# Patient Record
Sex: MALE | Race: WHITE | NOT HISPANIC OR LATINO | Employment: OTHER | URBAN - METROPOLITAN AREA
[De-identification: names, ages, dates, MRNs, and addresses within clinical notes are randomized per-mention and may not be internally consistent; named-entity substitution may affect disease eponyms.]

---

## 2023-04-20 ENCOUNTER — OFFICE VISIT (OUTPATIENT)
Dept: PRIMARY CARE | Facility: CLINIC | Age: 53
End: 2023-04-20
Payer: COMMERCIAL

## 2023-04-20 VITALS
WEIGHT: 240 LBS | SYSTOLIC BLOOD PRESSURE: 116 MMHG | BODY MASS INDEX: 31.81 KG/M2 | DIASTOLIC BLOOD PRESSURE: 60 MMHG | HEIGHT: 73 IN

## 2023-04-20 DIAGNOSIS — F41.9 ANXIETY: ICD-10-CM

## 2023-04-20 DIAGNOSIS — S14.109S INJURY OF CERVICAL SPINAL CORD, SEQUELA (MULTI): ICD-10-CM

## 2023-04-20 DIAGNOSIS — N31.9 NEUROGENIC BLADDER: Primary | ICD-10-CM

## 2023-04-20 DIAGNOSIS — K21.9 GASTROESOPHAGEAL REFLUX DISEASE WITHOUT ESOPHAGITIS: ICD-10-CM

## 2023-04-20 DIAGNOSIS — L30.9 DERMATITIS: ICD-10-CM

## 2023-04-20 DIAGNOSIS — E78.2 MIXED HYPERLIPIDEMIA: ICD-10-CM

## 2023-04-20 DIAGNOSIS — E55.9 VITAMIN D DEFICIENCY: ICD-10-CM

## 2023-04-20 PROBLEM — I10 HYPERTENSION: Status: ACTIVE | Noted: 2023-04-20

## 2023-04-20 PROBLEM — E78.5 HYPERLIPIDEMIA: Status: ACTIVE | Noted: 2023-04-20

## 2023-04-20 PROBLEM — S14.109A CERVICAL SPINAL CORD INJURY (MULTI): Status: ACTIVE | Noted: 2023-04-20

## 2023-04-20 PROBLEM — N39.0 RECURRENT UTI: Status: ACTIVE | Noted: 2023-04-20

## 2023-04-20 PROBLEM — G82.20 PARAPLEGIA (MULTI): Status: ACTIVE | Noted: 2023-04-20

## 2023-04-20 PROBLEM — R23.8 SKIN IRRITATION: Status: ACTIVE | Noted: 2023-04-20

## 2023-04-20 PROBLEM — N39.0 URINARY TRACT INFECTION: Status: ACTIVE | Noted: 2023-04-20

## 2023-04-20 PROBLEM — J32.9 SINUSITIS: Status: ACTIVE | Noted: 2023-04-20

## 2023-04-20 PROBLEM — L21.9 SEBORRHEIC DERMATITIS: Status: ACTIVE | Noted: 2023-04-20

## 2023-04-20 PROCEDURE — 99213 OFFICE O/P EST LOW 20 MIN: CPT | Performed by: INTERNAL MEDICINE

## 2023-04-20 PROCEDURE — 3074F SYST BP LT 130 MM HG: CPT | Performed by: INTERNAL MEDICINE

## 2023-04-20 PROCEDURE — 3078F DIAST BP <80 MM HG: CPT | Performed by: INTERNAL MEDICINE

## 2023-04-20 RX ORDER — CITALOPRAM 20 MG/1
20 TABLET, FILM COATED ORAL DAILY
COMMUNITY
End: 2023-04-20 | Stop reason: SDUPTHER

## 2023-04-20 RX ORDER — TOLTERODINE TARTRATE 1 MG/1
1 TABLET, EXTENDED RELEASE ORAL DAILY
COMMUNITY
End: 2023-09-05

## 2023-04-20 RX ORDER — OMEPRAZOLE 20 MG/1
40 CAPSULE, DELAYED RELEASE ORAL EVERY 12 HOURS
Qty: 90 CAPSULE | Refills: 1 | Status: SHIPPED | OUTPATIENT
Start: 2023-04-20 | End: 2023-07-22

## 2023-04-20 RX ORDER — TRIAMCINOLONE ACETONIDE 5 MG/G
CREAM TOPICAL
COMMUNITY
End: 2023-04-20 | Stop reason: SDUPTHER

## 2023-04-20 RX ORDER — CITALOPRAM 20 MG/1
20 TABLET, FILM COATED ORAL DAILY
Qty: 90 TABLET | Refills: 1 | Status: SHIPPED | OUTPATIENT
Start: 2023-04-20

## 2023-04-20 RX ORDER — BACLOFEN 20 MG/1
20 TABLET ORAL 2 TIMES DAILY
Qty: 180 TABLET | Refills: 1 | Status: SHIPPED | OUTPATIENT
Start: 2023-04-20 | End: 2023-06-16 | Stop reason: SDUPTHER

## 2023-04-20 RX ORDER — OMEPRAZOLE 20 MG/1
TABLET, DELAYED RELEASE ORAL EVERY 12 HOURS
COMMUNITY
Start: 2022-08-03 | End: 2023-04-20 | Stop reason: ENTERED-IN-ERROR

## 2023-04-20 RX ORDER — TOLTERODINE 4 MG/1
4 CAPSULE, EXTENDED RELEASE ORAL DAILY
Qty: 90 CAPSULE | Refills: 1 | Status: SHIPPED | OUTPATIENT
Start: 2023-04-20 | End: 2023-10-10

## 2023-04-20 RX ORDER — TOLTERODINE TARTRATE 1 MG/1
1 TABLET, EXTENDED RELEASE ORAL DAILY
Qty: 90 TABLET | Refills: 1 | Status: CANCELLED | OUTPATIENT
Start: 2023-04-20

## 2023-04-20 RX ORDER — TRIAMCINOLONE ACETONIDE 5 MG/G
CREAM TOPICAL 3 TIMES DAILY
Qty: 454 G | Refills: 1 | Status: SHIPPED | OUTPATIENT
Start: 2023-04-20

## 2023-04-20 RX ORDER — OMEPRAZOLE 20 MG/1
40 CAPSULE, DELAYED RELEASE ORAL EVERY 12 HOURS
COMMUNITY
Start: 2023-01-17 | End: 2023-04-20 | Stop reason: SDUPTHER

## 2023-04-20 NOTE — PROGRESS NOTES
"Subjective   Patient ID: John Mora is a 52 y.o. male who presents for Follow-up.    HPI   Patient is here for follow-up  Needs refills on baclofen Detrol and omeprazole  He has not done blood work for a while  Overall doing well no new complaints  Chronic Kwan catheter for neurogenic bladder    Past recap  For hospital follow-up   patient is here with complaints of having a bump on the right upper eyelid for 1 year denies any pain  He was in the hospital admitted for a day because of hematuria from catheter getting pulled traumatically  No UTI   Wants refill on the inhaler for occasional cough use  Follow-up on high cholesterol      follow up after blood work   not able to exercise due to quadriplegia  does eat pasta and bread    Patient is here for his routine checkup  He has not had cholesterol checked for a while he has history of high cholesterol  His UTIs are under control since his doing 3 days of antibiotic when he changes the Kwan catheter  He has his regular follow-up with the hematologist and does yearly CBC  He needs his home care renewal  For his PMR doctor Dr. Seals has recommended him to do ultrasound of urinary bladder and kidneys    old note   patient is here for renewal of wheelchair   current one is about 8 years old   its irreparably damaged and unsafe to use   causing pressure sore on left foot , patient required minor surgery   his condition from cervical cord injury in 1993,april leaving him paraplegic has not changed   he remains completely paraplegic neck down and is dependent on family for care   unable to ambulate or use his upper limbs      Review of Systems    Objective   /60   Ht 1.854 m (6' 1\")   Wt 109 kg (240 lb)   BMI 31.66 kg/m²     Physical Exam  Vitals reviewed.   Constitutional:       Appearance: Normal appearance.   HENT:      Head: Normocephalic and atraumatic.      Right Ear: Tympanic membrane, ear canal and external ear normal.      Left Ear: Tympanic membrane, " ear canal and external ear normal.      Nose: Nose normal.      Mouth/Throat:      Pharynx: Oropharynx is clear.   Eyes:      Extraocular Movements: Extraocular movements intact.      Conjunctiva/sclera: Conjunctivae normal.      Pupils: Pupils are equal, round, and reactive to light.   Cardiovascular:      Rate and Rhythm: Normal rate and regular rhythm.      Pulses: Normal pulses.      Heart sounds: Normal heart sounds.   Pulmonary:      Effort: Pulmonary effort is normal.      Breath sounds: Normal breath sounds.   Abdominal:      General: Abdomen is flat. Bowel sounds are normal.      Palpations: Abdomen is soft.   Musculoskeletal:      Cervical back: Normal range of motion and neck supple.      Comments: Wheelchair-bound   Skin:     General: Skin is warm and dry.   Neurological:      Mental Status: He is alert and oriented to person, place, and time.      Sensory: Sensory deficit present.      Motor: Weakness present.      Coordination: Coordination abnormal.      Gait: Gait abnormal.      Deep Tendon Reflexes: Reflexes abnormal.      Comments: quadriplegia   Psychiatric:         Mood and Affect: Mood normal.         Assessment/Plan   Problem List Items Addressed This Visit          Nervous    Cervical spinal cord injury (CMS/HCC)       Digestive    GERD (gastroesophageal reflux disease)    Relevant Medications    omeprazole (PriLOSEC) 20 mg DR capsule    tolterodine LA (Detrol LA) 4 mg 24 hr capsule    Other Relevant Orders    CBC    Comprehensive Metabolic Panel    Lipid Panel    Thyroid Stimulating Hormone       Genitourinary    Neurogenic bladder - Primary       Endocrine/Metabolic    Vitamin D deficiency    Relevant Medications    tolterodine LA (Detrol LA) 4 mg 24 hr capsule    Other Relevant Orders    CBC    Comprehensive Metabolic Panel    Lipid Panel    Thyroid Stimulating Hormone       Infectious/Inflammatory    Dermatitis    Relevant Medications    triamcinolone (Kenalog) 0.5 % cream    baclofen  (Lioresal) 20 mg tablet    tolterodine LA (Detrol LA) 4 mg 24 hr capsule       Other    Anxiety    Relevant Medications    citalopram (CeleXA) 20 mg tablet    tolterodine LA (Detrol LA) 4 mg 24 hr capsule    Hyperlipidemia        past recap  Physical   Patient is at his baseline  Blood work ordered  Ultrasound bladder and kidneys ordered  Continue maintenance Cipro  Refills given    8/3  Blood work reviewed  Cholesterol high  Vitamin D low  Patient is really reluctant to take any medications  Discussed modification and lifestyle to help with vitamin D deficiency and to decrease cholesterol  Repeat blood work in 3 months    1/16/23  No further hematuria  Epidermal cyst advised to use warm compresses and Isauro baby shampoo to clean the eyelids  If not better see eye doctor to remove it  Patient still did not do blood work for cholesterol blood work ordered again  Liver enzyme was still elevated in the hospital  Cut down carbs  Follow-up after blood work  Nebulizer solution refilled     4/20.23  Blood work ordered again  Refills given on baclofen for muscle spasms and rigidity  Detrol LA for neurogenic bladder  Omeprazole for GERD  Blood work for high cholesterol

## 2023-05-10 ENCOUNTER — TELEMEDICINE (OUTPATIENT)
Dept: PRIMARY CARE | Facility: CLINIC | Age: 53
End: 2023-05-10
Payer: COMMERCIAL

## 2023-05-10 ENCOUNTER — LAB (OUTPATIENT)
Dept: LAB | Facility: LAB | Age: 53
End: 2023-05-10
Payer: COMMERCIAL

## 2023-05-10 VITALS — BODY MASS INDEX: 31.81 KG/M2 | WEIGHT: 240 LBS | HEIGHT: 73 IN

## 2023-05-10 DIAGNOSIS — R35.0 URINARY FREQUENCY: ICD-10-CM

## 2023-05-10 DIAGNOSIS — R50.9 FEVER AND CHILLS: Primary | ICD-10-CM

## 2023-05-10 DIAGNOSIS — N31.9 NEUROGENIC BLADDER: ICD-10-CM

## 2023-05-10 PROBLEM — K52.9 NONINFECTIOUS GASTROENTERITIS: Status: ACTIVE | Noted: 2020-04-21

## 2023-05-10 PROBLEM — B35.6 DERMATOPHYTOSIS OF PERIANAL AREA: Status: ACTIVE | Noted: 2020-04-21

## 2023-05-10 PROBLEM — L30.9 ECZEMA: Status: ACTIVE | Noted: 2020-04-21

## 2023-05-10 PROBLEM — R10.33 PERIUMBILICAL PAIN: Status: ACTIVE | Noted: 2020-04-21

## 2023-05-10 PROBLEM — K27.9 PEPTIC ULCER: Status: ACTIVE | Noted: 2020-04-21

## 2023-05-10 PROBLEM — E78.00 PURE HYPERCHOLESTEROLEMIA: Status: ACTIVE | Noted: 2020-04-21

## 2023-05-10 PROBLEM — D61.9 APLASTIC ANEMIA (MULTI): Status: ACTIVE | Noted: 2020-04-21

## 2023-05-10 PROBLEM — R79.89 ABNORMAL LIVER FUNCTION TESTS: Status: ACTIVE | Noted: 2020-04-21

## 2023-05-10 PROBLEM — N39.0 URINARY TRACT INFECTIOUS DISEASE: Status: ACTIVE | Noted: 2020-04-21

## 2023-05-10 LAB
APPEARANCE, URINE: ABNORMAL
BILIRUBIN, URINE: NEGATIVE
BLOOD, URINE: ABNORMAL
COLOR, URINE: ABNORMAL
GLUCOSE, URINE: NEGATIVE MG/DL
KETONES, URINE: NEGATIVE MG/DL
LEUKOCYTE ESTERASE, URINE: ABNORMAL
MUCUS, URINE: ABNORMAL /LPF
NITRITE, URINE: POSITIVE
PH, URINE: 5 (ref 5–8)
PROTEIN, URINE: ABNORMAL MG/DL
RBC, URINE: 36 /HPF (ref 0–5)
SPECIFIC GRAVITY, URINE: 1.02 (ref 1–1.03)
SQUAMOUS EPITHELIAL CELLS, URINE: 1 /HPF
UROBILINOGEN, URINE: <2 MG/DL (ref 0–1.9)
WBC CLUMPS, URINE: ABNORMAL /HPF
WBC, URINE: >182 /HPF (ref 0–5)

## 2023-05-10 PROCEDURE — 99213 OFFICE O/P EST LOW 20 MIN: CPT | Performed by: INTERNAL MEDICINE

## 2023-05-10 PROCEDURE — 87077 CULTURE AEROBIC IDENTIFY: CPT

## 2023-05-10 PROCEDURE — 87086 URINE CULTURE/COLONY COUNT: CPT

## 2023-05-10 PROCEDURE — 87186 SC STD MICRODIL/AGAR DIL: CPT

## 2023-05-10 PROCEDURE — 81001 URINALYSIS AUTO W/SCOPE: CPT

## 2023-05-10 RX ORDER — ASPIRIN 81 MG/1
81 TABLET ORAL
COMMUNITY

## 2023-05-10 RX ORDER — BISACODYL 10 MG/1
10 SUPPOSITORY RECTAL
COMMUNITY
Start: 2020-04-21

## 2023-05-10 NOTE — PROGRESS NOTES
"Subjective   Patient ID: John Mora is a 52 y.o. male who presents for Difficulty Urinating.    HPI   Patient here complaining of not feeling good since yesterday having chills headache mucus around the catheter   He had his catheter replaced last week and takes antibiotics and has not had a problem for many years but now he is concerned that he could be having UTI     Past recap  patient is here for follow-up  Needs refills on baclofen Detrol and omeprazole  He has not done blood work for a while  Overall doing well no new complaints  Chronic Kwan catheter for neurogenic bladder     Past recap  For hospital follow-up   patient is here with complaints of having a bump on the right upper eyelid for 1 year denies any pain  He was in the hospital admitted for a day because of hematuria from catheter getting pulled traumatically  No UTI   Wants refill on the inhaler for occasional cough use  Follow-up on high cholesterol      follow up after blood work   not able to exercise due to quadriplegia  does eat pasta and bread     Patient is here for his routine checkup  He has not had cholesterol checked for a while he has history of high cholesterol  His UTIs are under control since his doing 3 days of antibiotic when he changes the Kwan catheter  He has his regular follow-up with the hematologist and does yearly CBC  He needs his home care renewal  For his PMR doctor Dr. Seals has recommended him to do ultrasound of urinary bladder and kidneys     old note   patient is here for renewal of wheelchair   current one is about 8 years old   its irreparably damaged and unsafe to use   causing pressure sore on left foot , patient required minor surgery   his condition from cervical cord injury in 1993,april leaving him paraplegic has not changed   he remains completely paraplegic neck down and is dependent on family for care   unable to ambulate or use his upper limbs      Review of Systems    Objective   Ht 1.854 m (6' 1\")   " Wt 109 kg (240 lb)   BMI 31.66 kg/m²     Physical Exam    Assessment/Plan   Problem List Items Addressed This Visit    None  Visit Diagnoses       Urinary frequency        Relevant Orders    Urinalysis with Reflex Microscopic    Urine Culture              past recap  Physical   Patient is at his baseline  Blood work ordered  Ultrasound bladder and kidneys ordered  Continue maintenance Cipro  Refills given     8/3  Blood work reviewed  Cholesterol high  Vitamin D low  Patient is really reluctant to take any medications  Discussed modification and lifestyle to help with vitamin D deficiency and to decrease cholesterol  Repeat blood work in 3 months     1/16/23  No further hematuria  Epidermal cyst advised to use warm compresses and Isauro baby shampoo to clean the eyelids  If not better see eye doctor to remove it  Patient still did not do blood work for cholesterol blood work ordered again  Liver enzyme was still elevated in the hospital  Cut down carbs  Follow-up after blood work  Nebulizer solution refilled      4/20.23  Blood work ordered again  Refills given on baclofen for muscle spasms and rigidity  Detrol LA for neurogenic bladder  Omeprazole for GERD  Blood work for high cholesterol    510 1/20/2023  We will send [facility] CNS  Patient is prone for UTI because of neurogenic bladder and chronic Kwan catheter  Advised to start the maintenance antibiotic again while waiting for the culture results and will call him with the results and adjust antibiotics

## 2023-05-12 LAB — URINE CULTURE: ABNORMAL

## 2023-05-15 DIAGNOSIS — N39.0 URINARY TRACT INFECTION WITHOUT HEMATURIA, SITE UNSPECIFIED: ICD-10-CM

## 2023-05-16 ENCOUNTER — LAB (OUTPATIENT)
Dept: LAB | Facility: LAB | Age: 53
End: 2023-05-16
Payer: COMMERCIAL

## 2023-05-16 DIAGNOSIS — N39.0 URINARY TRACT INFECTION WITHOUT HEMATURIA, SITE UNSPECIFIED: ICD-10-CM

## 2023-05-16 LAB
APPEARANCE, URINE: ABNORMAL
BILIRUBIN, URINE: NEGATIVE
BLOOD, URINE: NEGATIVE
COLOR, URINE: YELLOW
GLUCOSE, URINE: NEGATIVE MG/DL
KETONES, URINE: NEGATIVE MG/DL
LEUKOCYTE ESTERASE, URINE: ABNORMAL
NITRITE, URINE: NEGATIVE
PH, URINE: 7 (ref 5–8)
PROTEIN, URINE: NEGATIVE MG/DL
RBC, URINE: NORMAL /HPF (ref 0–5)
SPECIFIC GRAVITY, URINE: 1.01 (ref 1–1.03)
SQUAMOUS EPITHELIAL CELLS, URINE: <1 /HPF
UROBILINOGEN, URINE: <2 MG/DL (ref 0–1.9)
WBC, URINE: NORMAL /HPF (ref 0–5)

## 2023-05-16 PROCEDURE — 81001 URINALYSIS AUTO W/SCOPE: CPT

## 2023-05-16 PROCEDURE — 87186 SC STD MICRODIL/AGAR DIL: CPT

## 2023-05-16 PROCEDURE — 87077 CULTURE AEROBIC IDENTIFY: CPT

## 2023-05-16 PROCEDURE — 87086 URINE CULTURE/COLONY COUNT: CPT

## 2023-05-18 ENCOUNTER — TELEPHONE (OUTPATIENT)
Dept: PRIMARY CARE | Facility: CLINIC | Age: 53
End: 2023-05-18
Payer: COMMERCIAL

## 2023-05-18 LAB — URINE CULTURE: ABNORMAL

## 2023-06-16 DIAGNOSIS — L30.9 DERMATITIS: ICD-10-CM

## 2023-06-16 RX ORDER — BACLOFEN 20 MG/1
20 TABLET ORAL 3 TIMES DAILY
Qty: 180 TABLET | Refills: 1 | Status: SHIPPED | OUTPATIENT
Start: 2023-06-16 | End: 2023-10-10

## 2023-07-22 DIAGNOSIS — K21.9 GASTROESOPHAGEAL REFLUX DISEASE WITHOUT ESOPHAGITIS: ICD-10-CM

## 2023-07-22 RX ORDER — OMEPRAZOLE 20 MG/1
40 CAPSULE, DELAYED RELEASE ORAL EVERY 12 HOURS
Qty: 90 CAPSULE | Refills: 0 | Status: SHIPPED | OUTPATIENT
Start: 2023-07-22 | End: 2023-07-23

## 2023-07-23 DIAGNOSIS — K21.9 GASTROESOPHAGEAL REFLUX DISEASE WITHOUT ESOPHAGITIS: ICD-10-CM

## 2023-07-23 RX ORDER — OMEPRAZOLE 40 MG/1
40 CAPSULE, DELAYED RELEASE ORAL 2 TIMES DAILY
Qty: 180 CAPSULE | Refills: 0 | Status: SHIPPED | OUTPATIENT
Start: 2023-07-23 | End: 2023-07-24 | Stop reason: ENTERED-IN-ERROR

## 2023-07-24 DIAGNOSIS — K21.9 GASTROESOPHAGEAL REFLUX DISEASE WITHOUT ESOPHAGITIS: ICD-10-CM

## 2023-07-24 RX ORDER — OMEPRAZOLE 20 MG/1
20 TABLET, DELAYED RELEASE ORAL
Qty: 180 TABLET | Refills: 1 | COMMUNITY
Start: 2023-07-24 | End: 2024-01-20

## 2023-07-25 NOTE — PROGRESS NOTES
[facility] for omeprazole 20mg 1 tab bid qty 180 1 rf. [facility] will notify pt when ready to be picked up

## 2023-09-05 ENCOUNTER — TELEPHONE (OUTPATIENT)
Dept: PRIMARY CARE | Facility: CLINIC | Age: 53
End: 2023-09-05
Payer: COMMERCIAL

## 2023-09-05 DIAGNOSIS — K21.9 GASTRO-ESOPHAGEAL REFLUX DISEASE WITHOUT ESOPHAGITIS: ICD-10-CM

## 2023-09-05 RX ORDER — TOLTERODINE TARTRATE 1 MG/1
1 TABLET, EXTENDED RELEASE ORAL DAILY
Qty: 30 TABLET | Refills: 2 | Status: SHIPPED | OUTPATIENT
Start: 2023-09-05

## 2023-09-14 DIAGNOSIS — G82.20 PARAPLEGIA (MULTI): ICD-10-CM

## 2023-10-09 DIAGNOSIS — E55.9 VITAMIN D DEFICIENCY: ICD-10-CM

## 2023-10-09 DIAGNOSIS — F41.9 ANXIETY: ICD-10-CM

## 2023-10-09 DIAGNOSIS — K21.9 GASTROESOPHAGEAL REFLUX DISEASE WITHOUT ESOPHAGITIS: ICD-10-CM

## 2023-10-09 DIAGNOSIS — L30.9 DERMATITIS: ICD-10-CM

## 2023-10-10 RX ORDER — BACLOFEN 20 MG/1
20 TABLET ORAL 3 TIMES DAILY
Qty: 180 TABLET | Refills: 1 | Status: SHIPPED | OUTPATIENT
Start: 2023-10-10

## 2023-10-10 RX ORDER — TOLTERODINE 4 MG/1
4 CAPSULE, EXTENDED RELEASE ORAL DAILY
Qty: 90 CAPSULE | Refills: 1 | Status: SHIPPED | OUTPATIENT
Start: 2023-10-10 | End: 2024-04-07

## 2024-01-29 ENCOUNTER — OFFICE VISIT (OUTPATIENT)
Dept: PRIMARY CARE | Facility: CLINIC | Age: 54
End: 2024-01-29
Payer: COMMERCIAL

## 2024-01-29 VITALS — SYSTOLIC BLOOD PRESSURE: 120 MMHG | DIASTOLIC BLOOD PRESSURE: 62 MMHG

## 2024-01-29 DIAGNOSIS — L21.9 SEBORRHEIC DERMATITIS: ICD-10-CM

## 2024-01-29 PROCEDURE — 3074F SYST BP LT 130 MM HG: CPT | Performed by: INTERNAL MEDICINE

## 2024-01-29 PROCEDURE — 99213 OFFICE O/P EST LOW 20 MIN: CPT | Performed by: INTERNAL MEDICINE

## 2024-01-29 PROCEDURE — 3078F DIAST BP <80 MM HG: CPT | Performed by: INTERNAL MEDICINE

## 2024-01-29 RX ORDER — KETOCONAZOLE 20 MG/ML
SHAMPOO, SUSPENSION TOPICAL 2 TIMES WEEKLY
Qty: 3600 ML | Refills: 0 | Status: SHIPPED | OUTPATIENT
Start: 2024-01-29

## 2024-01-29 RX ORDER — TRIAMCINOLONE ACETONIDE 5 MG/G
CREAM TOPICAL 3 TIMES DAILY
Qty: 454 G | Refills: 1 | Status: CANCELLED | OUTPATIENT
Start: 2024-01-29

## 2024-01-29 RX ORDER — CLOBETASOL PROPIONATE 0.5 MG/G
AEROSOL, FOAM TOPICAL 2 TIMES DAILY
Qty: 100 G | Refills: 1 | Status: SHIPPED | OUTPATIENT
Start: 2024-01-29

## 2024-01-30 NOTE — PROGRESS NOTES
Subjective   Patient ID: John Mora is a 53 y.o. male who presents for Rash.    Rash       Patient is here with complaints of having rash on the face.  It is mainly on the right side of the face and the right forehead.  He does have a tendency to sweat on the right face from his neurogenic condition     Past recap   patient is here for follow-up  Needs refills on baclofen Detrol and omeprazole  He has not done blood work for a while  Overall doing well no new complaints  Chronic Kwan catheter for neurogenic bladder    Past recap  For hospital follow-up   patient is here with complaints of having a bump on the right upper eyelid for 1 year denies any pain  He was in the hospital admitted for a day because of hematuria from catheter getting pulled traumatically  No UTI   Wants refill on the inhaler for occasional cough use  Follow-up on high cholesterol      follow up after blood work   not able to exercise due to quadriplegia  does eat pasta and bread    Patient is here for his routine checkup  He has not had cholesterol checked for a while he has history of high cholesterol  His UTIs are under control since his doing 3 days of antibiotic when he changes the Kwan catheter  He has his regular follow-up with the hematologist and does yearly CBC  He needs his home care renewal  For his PMR doctor Dr. Seals has recommended him to do ultrasound of urinary bladder and kidneys    old note   patient is here for renewal of wheelchair   current one is about 8 years old   its irreparably damaged and unsafe to use   causing pressure sore on left foot , patient required minor surgery   his condition from cervical cord injury in 1993,april leaving him paraplegic has not changed   he remains completely paraplegic neck down and is dependent on family for care   unable to ambulate or use his upper limbs      Review of Systems   Skin:  Positive for rash.       Objective   /62     Physical Exam  Vitals reviewed.    Constitutional:       Appearance: Normal appearance.   HENT:      Head: Normocephalic and atraumatic.      Right Ear: Tympanic membrane, ear canal and external ear normal.      Left Ear: Tympanic membrane, ear canal and external ear normal.      Nose: Nose normal.      Mouth/Throat:      Pharynx: Oropharynx is clear.   Eyes:      Extraocular Movements: Extraocular movements intact.      Conjunctiva/sclera: Conjunctivae normal.      Pupils: Pupils are equal, round, and reactive to light.   Cardiovascular:      Rate and Rhythm: Normal rate and regular rhythm.      Pulses: Normal pulses.      Heart sounds: Normal heart sounds.   Pulmonary:      Effort: Pulmonary effort is normal.      Breath sounds: Normal breath sounds.   Abdominal:      General: Abdomen is flat. Bowel sounds are normal.      Palpations: Abdomen is soft.   Musculoskeletal:      Cervical back: Normal range of motion and neck supple.      Comments: Wheelchair-bound   Skin:     General: Skin is warm and dry.      Findings: Rash present.   Neurological:      Mental Status: He is alert and oriented to person, place, and time.      Sensory: Sensory deficit present.      Motor: Weakness present.      Coordination: Coordination abnormal.      Gait: Gait abnormal.      Deep Tendon Reflexes: Reflexes abnormal.      Comments: quadriplegia   Psychiatric:         Mood and Affect: Mood normal.         Assessment/Plan   Problem List Items Addressed This Visit          Skin    Dermatitis    Relevant Medications    ketoconazole (NIZOral) 2 % shampoo    clobetasol (Olux) 0.05 % topical foam        past recap  Physical   Patient is at his baseline  Blood work ordered  Ultrasound bladder and kidneys ordered  Continue maintenance Cipro  Refills given    8/3  Blood work reviewed  Cholesterol high  Vitamin D low  Patient is really reluctant to take any medications  Discussed modification and lifestyle to help with vitamin D deficiency and to decrease cholesterol  Repeat  blood work in 3 months    1/16/23  No further hematuria  Epidermal cyst advised to use warm compresses and Isauro baby shampoo to clean the eyelids  If not better see eye doctor to remove it  Patient still did not do blood work for cholesterol blood work ordered again  Liver enzyme was still elevated in the hospital  Cut down carbs  Follow-up after blood work  Nebulizer solution refilled     4/20.23  Blood work ordered again  Refills given on baclofen for muscle spasms and rigidity  Detrol LA for neurogenic bladder  Omeprazole for GERD  Blood work for high cholesterol    1/29/2024  Patient has seborrheic dermatitis  Will treat with ketoconazole shampoo and Olux foam  Discussed pathophysiology  Follow-up for recheck in few weeks

## 2024-02-01 ENCOUNTER — TELEPHONE (OUTPATIENT)
Dept: PRIMARY CARE | Facility: CLINIC | Age: 54
End: 2024-02-01
Payer: COMMERCIAL

## 2024-09-27 ENCOUNTER — TELEMEDICINE (OUTPATIENT)
Dept: PRIMARY CARE | Facility: CLINIC | Age: 54
End: 2024-09-27
Payer: COMMERCIAL

## 2024-09-27 DIAGNOSIS — L30.9 DERMATITIS: ICD-10-CM

## 2024-09-27 DIAGNOSIS — L21.9 SEBORRHEIC DERMATITIS: Primary | ICD-10-CM

## 2024-09-27 PROCEDURE — 99213 OFFICE O/P EST LOW 20 MIN: CPT | Performed by: INTERNAL MEDICINE

## 2024-09-27 RX ORDER — TRIAMCINOLONE ACETONIDE 5 MG/G
CREAM TOPICAL 3 TIMES DAILY
Qty: 454 G | Refills: 1 | Status: SHIPPED | OUTPATIENT
Start: 2024-09-27

## 2024-09-30 NOTE — PROGRESS NOTES
Subjective   Patient ID: John Mora is a 54 y.o. male who presents for Virtual Visit and Med Refill.    Rash       Patient is having flareup of the dermatitis he had before having rash on the chin and the nose  Patient could not come in person because his transport his dad is in the hospital undergoing bladder cancer treatment    Past recap  Patient is here with complaints of having rash on the face.  It is mainly on the right side of the face and the right forehead.  He does have a tendency to sweat on the right face from his neurogenic condition     patient is here for follow-up  Needs refills on baclofen Detrol and omeprazole  He has not done blood work for a while  Overall doing well no new complaints  Chronic Kwan catheter for neurogenic bladder    For hospital follow-up   patient is here with complaints of having a bump on the right upper eyelid for 1 year denies any pain  He was in the hospital admitted for a day because of hematuria from catheter getting pulled traumatically  No UTI   Wants refill on the inhaler for occasional cough use  Follow-up on high cholesterol      follow up after blood work   not able to exercise due to quadriplegia  does eat pasta and bread    Patient is here for his routine checkup  He has not had cholesterol checked for a while he has history of high cholesterol  His UTIs are under control since his doing 3 days of antibiotic when he changes the Kwan catheter  He has his regular follow-up with the hematologist and does yearly CBC  He needs his home care renewal  For his PMR doctor Dr. Seals has recommended him to do ultrasound of urinary bladder and kidneys    old note   patient is here for renewal of wheelchair   current one is about 8 years old   its irreparably damaged and unsafe to use   causing pressure sore on left foot , patient required minor surgery   his condition from cervical cord injury in 1993,april leaving him paraplegic has not changed   he remains completely  paraplegic neck down and is dependent on family for care   unable to ambulate or use his upper limbs      Review of Systems   Skin:  Positive for rash.       Objective   There were no vitals taken for this visit.        Assessment/Plan   Problem List Items Addressed This Visit          Skin    Dermatitis    Relevant Medications    triamcinolone (Kenalog) 0.5 % cream        past recap  Physical   Patient is at his baseline  Blood work ordered  Ultrasound bladder and kidneys ordered  Continue maintenance Cipro  Refills given    8/3  Blood work reviewed  Cholesterol high  Vitamin D low  Patient is really reluctant to take any medications  Discussed modification and lifestyle to help with vitamin D deficiency and to decrease cholesterol  Repeat blood work in 3 months    1/16/23  No further hematuria  Epidermal cyst advised to use warm compresses and Isauro baby shampoo to clean the eyelids  If not better see eye doctor to remove it  Patient still did not do blood work for cholesterol blood work ordered again  Liver enzyme was still elevated in the hospital  Cut down carbs  Follow-up after blood work  Nebulizer solution refilled     4/20.23  Blood work ordered again  Refills given on baclofen for muscle spasms and rigidity  Detrol LA for neurogenic bladder  Omeprazole for GERD  Blood work for high cholesterol    1/29/2024  Patient has seborrheic dermatitis  Will treat with ketoconazole shampoo and Olux foam  Discussed pathophysiology  Follow-up for recheck in few weeks    9/27/2024  Triamcinolone cream for the dermatitis refilled  Continue ketoconazole shampoo as maintenance

## 2024-10-29 ENCOUNTER — APPOINTMENT (OUTPATIENT)
Dept: PRIMARY CARE | Facility: CLINIC | Age: 54
End: 2024-10-29
Payer: COMMERCIAL

## 2024-10-31 ENCOUNTER — APPOINTMENT (OUTPATIENT)
Dept: PRIMARY CARE | Facility: CLINIC | Age: 54
End: 2024-10-31
Payer: COMMERCIAL

## 2024-10-31 VITALS — DIASTOLIC BLOOD PRESSURE: 72 MMHG | SYSTOLIC BLOOD PRESSURE: 122 MMHG

## 2024-10-31 DIAGNOSIS — H61.23 BILATERAL IMPACTED CERUMEN: ICD-10-CM

## 2024-10-31 PROCEDURE — 3078F DIAST BP <80 MM HG: CPT | Performed by: INTERNAL MEDICINE

## 2024-10-31 PROCEDURE — 3074F SYST BP LT 130 MM HG: CPT | Performed by: INTERNAL MEDICINE

## 2024-10-31 PROCEDURE — 99213 OFFICE O/P EST LOW 20 MIN: CPT | Performed by: INTERNAL MEDICINE

## 2024-11-02 PROCEDURE — 69209 REMOVE IMPACTED EAR WAX UNI: CPT | Performed by: INTERNAL MEDICINE

## 2025-03-03 ENCOUNTER — OFFICE VISIT (OUTPATIENT)
Dept: PRIMARY CARE | Facility: CLINIC | Age: 55
End: 2025-03-03
Payer: COMMERCIAL

## 2025-03-03 DIAGNOSIS — L08.9 TOE INFECTION: ICD-10-CM

## 2025-03-03 DIAGNOSIS — L03.116 CELLULITIS OF LEFT LOWER EXTREMITY: Primary | ICD-10-CM

## 2025-03-03 PROCEDURE — 99214 OFFICE O/P EST MOD 30 MIN: CPT | Performed by: INTERNAL MEDICINE

## 2025-03-03 RX ORDER — DOXYCYCLINE 100 MG/1
100 CAPSULE ORAL 2 TIMES DAILY
Qty: 20 CAPSULE | Refills: 0 | Status: SHIPPED | OUTPATIENT
Start: 2025-03-03

## 2025-03-03 RX ORDER — AMOXICILLIN AND CLAVULANATE POTASSIUM 875; 125 MG/1; MG/1
875 TABLET, FILM COATED ORAL 2 TIMES DAILY
Qty: 20 TABLET | Refills: 0 | Status: SHIPPED | OUTPATIENT
Start: 2025-03-03 | End: 2025-03-13

## 2025-03-06 NOTE — PROGRESS NOTES
Subjective   Patient ID: John Mora is a 54 y.o. male who presents for Foot Wound Check (Left foot swollen, left great toe wound, left inner knee redness).    HPI   Patient presents with complaints of left big toe wound for few weeks.  Now he is having redness of the foot on the leg and feeling into the thigh  Review of Systems    Objective   There were no vitals taken for this visit.    Physical Exam  Vitals reviewed.   Constitutional:       Appearance: Normal appearance.   HENT:      Head: Normocephalic and atraumatic.      Right Ear: Tympanic membrane, ear canal and external ear normal.      Left Ear: Tympanic membrane, ear canal and external ear normal.      Nose: Nose normal.      Mouth/Throat:      Pharynx: Oropharynx is clear.   Eyes:      Extraocular Movements: Extraocular movements intact.      Conjunctiva/sclera: Conjunctivae normal.      Pupils: Pupils are equal, round, and reactive to light.   Cardiovascular:      Rate and Rhythm: Normal rate and regular rhythm.      Pulses: Normal pulses.      Heart sounds: Normal heart sounds.   Pulmonary:      Effort: Pulmonary effort is normal.      Breath sounds: Normal breath sounds.   Abdominal:      General: Abdomen is flat. Bowel sounds are normal.      Palpations: Abdomen is soft.   Musculoskeletal:      Cervical back: Normal range of motion and neck supple.      Comments: Paraplegic   Skin:     General: Skin is warm and dry.      Findings: Lesion and rash present.      Comments: Left great toe ulceration on the tip with swelling of the great  Erythema extending on the foot going up the leg and a streak going up the left thigh   Neurological:      General: No focal deficit present.      Mental Status: He is alert and oriented to person, place, and time.   Psychiatric:         Mood and Affect: Mood normal.         Assessment/Plan   Problem List Items Addressed This Visit    None  Visit Diagnoses         Codes    Cellulitis of left lower extremity    -   Primary L03.116    Toe infection     L08.9    Relevant Medications    amoxicillin-pot clavulanate (Augmentin) 875-125 mg tablet    doxycycline (Vibramycin) 100 mg capsule        Advised patient to be hospitalized for IV antibiotic as cellulitis is extensive  Father and son does not want to be in the hospital at this point they want to try oral antibiotic first  Prescription for Augmentin and doxycycline.  Follow-up in a week  Advised to go to the hospital if symptoms gets worse

## 2025-03-13 ENCOUNTER — CLINICAL SUPPORT (OUTPATIENT)
Dept: OCCUPATIONAL THERAPY | Facility: CLINIC | Age: 55
End: 2025-03-13
Payer: COMMERCIAL

## 2025-03-13 DIAGNOSIS — M62.838 MUSCLE SPASTICITY: ICD-10-CM

## 2025-03-13 DIAGNOSIS — G82.51 QUADRIPLEGIA, C1-C4 COMPLETE (MULTI): Primary | ICD-10-CM

## 2025-03-13 DIAGNOSIS — M62.838 OTHER MUSCLE SPASM: ICD-10-CM

## 2025-03-13 PROCEDURE — 97167 OT EVAL HIGH COMPLEX 60 MIN: CPT | Mod: GO

## 2025-03-13 ASSESSMENT — PAIN - FUNCTIONAL ASSESSMENT: PAIN_FUNCTIONAL_ASSESSMENT: 0-10

## 2025-03-13 ASSESSMENT — PAIN SCALES - GENERAL: PAINLEVEL_OUTOF10: 0 - NO PAIN

## 2025-03-13 NOTE — PROGRESS NOTES
Occupational Therapy    Occupational Therapy Evaluation    Name: John Mora  MRN: [id number]  : 1970  Date: 25      Time Calculation  Start Time: 0100  Stop Time: 0200  Time Calculation (min): 60 min  OT Evaluation Time Entry  OT Evaluation (Complex) Time Entry: 60                    Visit: 1  [facility]  Problem List Items Addressed This Visit             ICD-10-CM    Muscle spasticity M62.838     Other Visit Diagnoses         Codes    Quadriplegia, C1-C4 complete (Multi)    -  Primary G82.51    Other muscle spasm     M62.838            Assessment:   Patient is a 54 year old male, who is a C1-C4 quadriplegia, resulting in patient requiring total A for all ADLs and transfers. Due to patient's diagnosis, patient is non ambulatory and cannot use a cane, walker, or crutches. Due to quadriplegia and poor sitting balance, a regular standard wheelchair cannot give patient the postural support patient requires for sitting balance and sitting tolerance for ADLs. Patient requires a manual tilt in space with a roho cushion to relieve pressure to reduce risk of further pressure sores.     Plan:   No further visits planned.    Subjective  Patient agreeable to wheelchair evaluation. Accompanied by father.  Patient has been a Quadriplegic since  due to a sports accident.   Patient lives with parents in a [place] house. Has ramp into the home. Has first floor set up. Has assist with all ADLs and transfers. Gets transferred with a gregorio lift. Has a hospital bed. Patient also has a home alex aide.  Patient reports currently has a pressure wound on L ischium,  and getting wound care currently. Patient also has a small wound on L big toe.   Reports is in his chair about 9-10 hours a day.   Patient's current manual tilt in space is over 5 years old that patient and family purchased themselves.    6'1''  233#    Current Problem:  1. Quadriplegia, C1-C4 complete (Multi)  Referral to Occupational Therapy      2. Other  "muscle spasm  Referral to Occupational Therapy      3. Muscle spasticity          General:      General  Reason for Referral: Wheelchair evaluation  Referred By:  (Yehuda Seals, )  Preferred Learning Style: verbal, visual, written  Precautions:   HIGH FALL RISK;   Vital Signs:   Not taken   Pain Assessment:  Pain Assessment  Pain Assessment: 0-10  0-10 (Numeric) Pain Score: 0 - No pain  Work History:  Not working   Prior Level of Function:  Requires dependent care for all ADLs and transfers  Roles/Routines:  Likes to go on the computer   Patient Goal:  \"Get a chair that I fit in better\"  Personal Factors that my impact Care:   N/a  Objective   Originally R handed  Observation:  Came in manual tilt w/c with chest belt  LLE slightly more abducted in sitting  Able to control head and neck    ROM  RUE:   PROM only   Shoulder flexion to 90 degrees  LUE:   PROM only     BLE:  PROM only  Hip flexion to 90 degrees     Strength:  RUE:   Shoulder elevation: 2-/5  Shoulder flexion: 1/5  shoulder abduction: 0/5   External rotation:  0/5  Internal rotation:   0/5  Elbow flexion:   0/5  Wrist extension:   0/5  Hand edema   Mild digit contractures  LUE:   Shoulder elevation: 2-/5  Shoulder flexion:  shoulder abduction: 0/5   External rotation: 0 /5  Internal rotation:   0/5  Elbow flexion:   0 /5  Wrist extension:   0/5  Hand edema  Mild digit contractures    RLE: hip flexion: 0 /5    hip extension:  0/5    hip abduction:  0/5     hip adduction:  0/5     knee flexion:   0/5,    knee extension:   0 /5      dorsiflexion:  0/5    plantar flexion:   0/5  Mild edema in ankle/foot    LLE: hip flexion:  0/5    hip extension:  0/5    hip abduction: 0/5      hip adduction: 0 /5       knee flexion:  0/5,    knee extension:    0/5      dorsiflexion:0  /5      plantar flexion:   0/5   Mild edema in ankle/foot  Coordination:  Impaired  Sensation:  Impaired  Tone:  Mild to moderate tone throughout BUE's and BLE's  Outcome Measures:  Total A " for transfers  Non ambulatory  Poor sitting balance    OP EDUCATION:   Educated on wheelchair process  I also educated patient on tilting back more throughout the day, as well as the importance of good supports on wheelchair to reduce pressure sores at other points of contact ex toes, knees, hips.

## 2025-03-27 ENCOUNTER — TELEPHONE (OUTPATIENT)
Dept: PRIMARY CARE | Facility: CLINIC | Age: 55
End: 2025-03-27
Payer: COMMERCIAL

## 2025-03-28 ENCOUNTER — OFFICE VISIT (OUTPATIENT)
Dept: PRIMARY CARE | Facility: CLINIC | Age: 55
End: 2025-03-28
Payer: COMMERCIAL

## 2025-03-28 VITALS — SYSTOLIC BLOOD PRESSURE: 130 MMHG | HEIGHT: 73 IN | BODY MASS INDEX: 31.66 KG/M2 | DIASTOLIC BLOOD PRESSURE: 72 MMHG

## 2025-03-28 DIAGNOSIS — E43 UNSPECIFIED SEVERE PROTEIN-CALORIE MALNUTRITION (MULTI): ICD-10-CM

## 2025-03-28 DIAGNOSIS — G82.20 PARAPLEGIA, UNSPECIFIED: ICD-10-CM

## 2025-03-28 DIAGNOSIS — Z00.00 ANNUAL PHYSICAL EXAM: Primary | ICD-10-CM

## 2025-03-28 DIAGNOSIS — L08.9 TOE INFECTION: ICD-10-CM

## 2025-03-28 PROCEDURE — 99396 PREV VISIT EST AGE 40-64: CPT | Performed by: INTERNAL MEDICINE

## 2025-03-28 PROCEDURE — 3075F SYST BP GE 130 - 139MM HG: CPT | Performed by: INTERNAL MEDICINE

## 2025-03-28 PROCEDURE — 3078F DIAST BP <80 MM HG: CPT | Performed by: INTERNAL MEDICINE

## 2025-03-28 RX ORDER — DOXYCYCLINE 100 MG/1
100 CAPSULE ORAL 2 TIMES DAILY
Qty: 20 CAPSULE | Refills: 0 | Status: SHIPPED | OUTPATIENT
Start: 2025-03-28

## 2025-03-29 NOTE — PROGRESS NOTES
"Subjective   Patient ID: John Mora is a 54 y.o. male who presents for Follow-up.    HPI   Patient is here for follow-up on the left big toe infection.  Dad is taking care of the wound.  It is healing beautifully.  Patient is also due for his routine blood work has not done a physical for a long time     Patient presents with complaints of left big toe wound for few weeks.  Now he is having redness of the foot on the leg and feeling into the thigh    Social history: Non-smoker social drinker  Past medical history cervical cord injury in 1993 April leaving him paraplegic, parents provide care history of high cholesterol, chronic Kwan catheter for neurogenic bladder  Review of Systems    Objective   /72   Ht 1.854 m (6' 1\")   BMI 31.66 kg/m²     Physical Exam  Vitals reviewed.   Constitutional:       Appearance: Normal appearance.   HENT:      Head: Normocephalic and atraumatic.      Right Ear: Tympanic membrane, ear canal and external ear normal.      Left Ear: Tympanic membrane, ear canal and external ear normal.      Nose: Nose normal.      Mouth/Throat:      Pharynx: Oropharynx is clear.   Eyes:      Extraocular Movements: Extraocular movements intact.      Conjunctiva/sclera: Conjunctivae normal.      Pupils: Pupils are equal, round, and reactive to light.   Cardiovascular:      Rate and Rhythm: Normal rate and regular rhythm.      Pulses: Normal pulses.      Heart sounds: Normal heart sounds.   Pulmonary:      Effort: Pulmonary effort is normal.      Breath sounds: Normal breath sounds.   Abdominal:      General: Abdomen is flat. Bowel sounds are normal.      Palpations: Abdomen is soft.   Musculoskeletal:      Cervical back: Normal range of motion and neck supple.      Comments: Paraplegic   Skin:     General: Skin is warm and dry.      Findings: Lesion and rash present.      Comments: Erythema on the leg is resolved.  Small open wound still persisting on the tip of the left big toe   Neurological: "      General: No focal deficit present.      Mental Status: He is alert and oriented to person, place, and time.      Motor: Weakness present.      Coordination: Coordination abnormal.      Gait: Gait abnormal.   Psychiatric:         Mood and Affect: Mood normal.         Assessment/Plan   Problem List Items Addressed This Visit    None  Visit Diagnoses         Codes    Annual physical exam    -  Primary Z00.00    Relevant Orders    CBC    Comprehensive Metabolic Panel    Lipid Panel    TSH with reflex to Free T4 if abnormal    Vitamin B12    Vitamin D 25-Hydroxy,Total (for eval of Vitamin D levels)    Toe infection     L08.9    Relevant Medications    doxycycline (Vibramycin) 100 mg capsule    Paraplegia, unspecified     G82.20    Relevant Orders    CBC    Unspecified severe protein-calorie malnutrition (Multi)     E43    Relevant Orders    Vitamin D 25-Hydroxy,Total (for eval of Vitamin D levels)        Past recap  Advised patient to be hospitalized for IV antibiotic as cellulitis is extensive  Father and son does not want to be in the hospital at this point they want to try oral antibiotic first  Prescription for Augmentin and doxycycline.  Follow-up in a week  Advised to go to the hospital if symptoms gets worse    3/28/2025  Cellulitis of the leg is resolved  Continue antibiotic for few more days for the ulceration on the tip of the toe to prevent infection until she is completely  Routine blood work ordered  Clinically patient remains stable

## 2025-04-09 DIAGNOSIS — L08.9 TOE INFECTION: ICD-10-CM

## 2025-05-03 ENCOUNTER — APPOINTMENT (OUTPATIENT)
Dept: PRIMARY CARE | Facility: CLINIC | Age: 55
End: 2025-05-03
Payer: COMMERCIAL

## 2025-05-08 ENCOUNTER — APPOINTMENT (OUTPATIENT)
Dept: PRIMARY CARE | Facility: CLINIC | Age: 55
End: 2025-05-08
Payer: COMMERCIAL

## 2025-05-15 ENCOUNTER — APPOINTMENT (OUTPATIENT)
Dept: PRIMARY CARE | Facility: CLINIC | Age: 55
End: 2025-05-15
Payer: COMMERCIAL

## 2025-05-22 ENCOUNTER — APPOINTMENT (OUTPATIENT)
Dept: PRIMARY CARE | Facility: CLINIC | Age: 55
End: 2025-05-22
Payer: COMMERCIAL

## 2025-05-29 ENCOUNTER — APPOINTMENT (OUTPATIENT)
Dept: PRIMARY CARE | Facility: CLINIC | Age: 55
End: 2025-05-29
Payer: COMMERCIAL

## 2025-06-05 ENCOUNTER — APPOINTMENT (OUTPATIENT)
Dept: PRIMARY CARE | Facility: CLINIC | Age: 55
End: 2025-06-05
Payer: COMMERCIAL

## 2025-06-05 VITALS
WEIGHT: 240 LBS | SYSTOLIC BLOOD PRESSURE: 128 MMHG | HEIGHT: 73 IN | DIASTOLIC BLOOD PRESSURE: 70 MMHG | BODY MASS INDEX: 31.81 KG/M2

## 2025-06-05 DIAGNOSIS — L08.9 TOE INFECTION: ICD-10-CM

## 2025-06-05 DIAGNOSIS — L97.521 SKIN ULCER OF TOE OF LEFT FOOT, LIMITED TO BREAKDOWN OF SKIN: Primary | ICD-10-CM

## 2025-06-05 PROBLEM — Z86.2 HISTORY OF APLASTIC ANEMIA: Status: ACTIVE | Noted: 2025-06-05

## 2025-06-05 PROBLEM — T83.9XXA: Status: ACTIVE | Noted: 2023-01-04

## 2025-06-05 PROBLEM — Z20.822 CONTACT WITH AND (SUSPECTED) EXPOSURE TO COVID-19: Status: ACTIVE | Noted: 2023-01-04

## 2025-06-05 PROBLEM — H02.829 CYST OF EYELID: Status: ACTIVE | Noted: 2025-06-05

## 2025-06-05 PROBLEM — Z86.39 HISTORY OF METABOLIC DISORDER: Status: ACTIVE | Noted: 2025-06-05

## 2025-06-05 PROBLEM — H44.9 DISORDER OF GLOBE: Status: ACTIVE | Noted: 2025-06-05

## 2025-06-05 PROBLEM — R05.9 COUGH: Status: ACTIVE | Noted: 2025-06-05

## 2025-06-05 PROCEDURE — 99213 OFFICE O/P EST LOW 20 MIN: CPT | Performed by: INTERNAL MEDICINE

## 2025-06-05 PROCEDURE — 3078F DIAST BP <80 MM HG: CPT | Performed by: INTERNAL MEDICINE

## 2025-06-05 PROCEDURE — 3008F BODY MASS INDEX DOCD: CPT | Performed by: INTERNAL MEDICINE

## 2025-06-05 PROCEDURE — 3074F SYST BP LT 130 MM HG: CPT | Performed by: INTERNAL MEDICINE

## 2025-06-05 PROCEDURE — 1036F TOBACCO NON-USER: CPT | Performed by: INTERNAL MEDICINE

## 2025-06-05 RX ORDER — CIPROFLOXACIN 500 MG/1
TABLET, FILM COATED ORAL
COMMUNITY
Start: 2024-12-01

## 2025-06-05 RX ORDER — OMEPRAZOLE 20 MG/1
1 CAPSULE, DELAYED RELEASE ORAL
COMMUNITY
Start: 2025-04-07

## 2025-06-05 RX ORDER — DOXYCYCLINE 100 MG/1
100 CAPSULE ORAL 2 TIMES DAILY
Qty: 20 CAPSULE | Refills: 0 | Status: SHIPPED | OUTPATIENT
Start: 2025-06-05

## 2025-06-06 NOTE — PROGRESS NOTES
"Subjective   Patient ID: John Mora is a 54 y.o. male who presents for Follow-up.    HPI   Patient is here for follow-up on the left big toe infection.  Father noticed a small area of pus discharge.  He is doing local wound care and overall it is doing much better    Past recap  Patient is here for follow-up on the left big toe infection.  Dad is taking care of the wound.  It is healing beautifully.  Patient is also due for his routine blood work has not done a physical for a long time     Patient presents with complaints of left big toe wound for few weeks.  Now he is having redness of the foot on the leg and feeling into the thigh    Social history: Non-smoker social drinker  Past medical history cervical cord injury in 1993 April leaving him paraplegic, parents provide care history of high cholesterol, chronic Kwan catheter for neurogenic bladder  Review of Systems    Objective   /70   Ht 1.854 m (6' 1\")   Wt 109 kg (240 lb)   BMI 31.66 kg/m²     Physical Exam  Vitals reviewed.   Constitutional:       Appearance: Normal appearance.   HENT:      Head: Normocephalic and atraumatic.      Right Ear: Tympanic membrane, ear canal and external ear normal.      Left Ear: Tympanic membrane, ear canal and external ear normal.      Nose: Nose normal.      Mouth/Throat:      Pharynx: Oropharynx is clear.   Eyes:      Extraocular Movements: Extraocular movements intact.      Conjunctiva/sclera: Conjunctivae normal.      Pupils: Pupils are equal, round, and reactive to light.   Cardiovascular:      Rate and Rhythm: Normal rate and regular rhythm.      Pulses: Normal pulses.      Heart sounds: Normal heart sounds.   Pulmonary:      Effort: Pulmonary effort is normal.      Breath sounds: Normal breath sounds.   Abdominal:      General: Abdomen is flat. Bowel sounds are normal.      Palpations: Abdomen is soft.   Musculoskeletal:      Cervical back: Normal range of motion and neck supple.      Comments: Paraplegic "   Skin:     General: Skin is warm and dry.      Findings: Lesion and rash present.      Comments: Erythema on the leg is resolved.  Small open wound still persisting on the tip of the left big toe   Neurological:      General: No focal deficit present.      Mental Status: He is alert and oriented to person, place, and time.      Motor: Weakness present.      Coordination: Coordination abnormal.      Gait: Gait abnormal.   Psychiatric:         Mood and Affect: Mood normal.         Assessment/Plan   Problem List Items Addressed This Visit    None  Visit Diagnoses         Codes      Skin ulcer of toe of left foot, limited to breakdown of skin    -  Primary L97.521      Toe infection     L08.9    Relevant Medications    doxycycline (Vibramycin) 100 mg capsule          Past recap  Advised patient to be hospitalized for IV antibiotic as cellulitis is extensive  Father and son does not want to be in the hospital at this point they want to try oral antibiotic first  Prescription for Augmentin and doxycycline.  Follow-up in a week  Advised to go to the hospital if symptoms gets worse    3/28/2025  Cellulitis of the leg is resolved  Continue antibiotic for few more days for the ulceration on the tip of the toe to prevent infection until she is completely  Routine blood work ordered  Clinically patient remains stable    6/5/2025  Wound is healing well but still has a small open area  Treat with doxycycline 100 mg twice a day  Continue local wound care

## 2025-07-27 ENCOUNTER — HOSPITAL ENCOUNTER (INPATIENT)
Facility: HOSPITAL | Age: 55
End: 2025-07-27
Attending: EMERGENCY MEDICINE | Admitting: INTERNAL MEDICINE
Payer: COMMERCIAL

## 2025-07-27 ENCOUNTER — APPOINTMENT (OUTPATIENT)
Dept: RADIOLOGY | Facility: HOSPITAL | Age: 55
End: 2025-07-27
Payer: COMMERCIAL

## 2025-07-27 ENCOUNTER — APPOINTMENT (OUTPATIENT)
Dept: CARDIOLOGY | Facility: HOSPITAL | Age: 55
End: 2025-07-27
Payer: COMMERCIAL

## 2025-07-27 DIAGNOSIS — I48.92 ATRIAL FLUTTER, UNSPECIFIED TYPE (MULTI): Primary | ICD-10-CM

## 2025-07-27 DIAGNOSIS — Z16.12 ESBL (EXTENDED SPECTRUM BETA-LACTAMASE) PRODUCING BACTERIA INFECTION: ICD-10-CM

## 2025-07-27 DIAGNOSIS — L03.115 CELLULITIS OF RIGHT LOWER EXTREMITY: ICD-10-CM

## 2025-07-27 DIAGNOSIS — A41.9 SEPSIS, DUE TO UNSPECIFIED ORGANISM, UNSPECIFIED WHETHER ACUTE ORGAN DYSFUNCTION PRESENT (MULTI): ICD-10-CM

## 2025-07-27 DIAGNOSIS — A49.9 ESBL (EXTENDED SPECTRUM BETA-LACTAMASE) PRODUCING BACTERIA INFECTION: ICD-10-CM

## 2025-07-27 DIAGNOSIS — R94.31 ABNORMAL ELECTROCARDIOGRAM (ECG) (EKG): ICD-10-CM

## 2025-07-27 DIAGNOSIS — N39.0 URINARY TRACT INFECTION ASSOCIATED WITH CATHETERIZATION OF URINARY TRACT, UNSPECIFIED INDWELLING URINARY CATHETER TYPE, INITIAL ENCOUNTER: ICD-10-CM

## 2025-07-27 DIAGNOSIS — T83.511A URINARY TRACT INFECTION ASSOCIATED WITH CATHETERIZATION OF URINARY TRACT, UNSPECIFIED INDWELLING URINARY CATHETER TYPE, INITIAL ENCOUNTER: ICD-10-CM

## 2025-07-27 LAB
ALBUMIN SERPL BCP-MCNC: 3.5 G/DL (ref 3.4–5)
ALP SERPL-CCNC: 63 U/L (ref 33–120)
ALT SERPL W P-5'-P-CCNC: 53 U/L (ref 10–52)
ANION GAP SERPL CALCULATED.3IONS-SCNC: 12 MMOL/L (ref 10–20)
APPEARANCE UR: CLEAR
AST SERPL W P-5'-P-CCNC: 52 U/L (ref 9–39)
BACTERIA #/AREA URNS AUTO: ABNORMAL /HPF
BASOPHILS # BLD AUTO: 0.02 X10*3/UL (ref 0–0.1)
BASOPHILS NFR BLD AUTO: 0.3 %
BILIRUB SERPL-MCNC: 1 MG/DL (ref 0–1.2)
BILIRUB UR STRIP.AUTO-MCNC: NEGATIVE MG/DL
BUN SERPL-MCNC: 12 MG/DL (ref 6–23)
CALCIUM SERPL-MCNC: 8.6 MG/DL (ref 8.6–10.3)
CARDIAC TROPONIN I PNL SERPL HS: 6 NG/L (ref 0–20)
CARDIAC TROPONIN I PNL SERPL HS: 6 NG/L (ref 0–20)
CHLORIDE SERPL-SCNC: 97 MMOL/L (ref 98–107)
CO2 SERPL-SCNC: 25 MMOL/L (ref 21–32)
COLOR UR: COLORLESS
CREAT SERPL-MCNC: 0.61 MG/DL (ref 0.5–1.3)
EGFRCR SERPLBLD CKD-EPI 2021: >90 ML/MIN/1.73M*2
EOSINOPHIL # BLD AUTO: 0.01 X10*3/UL (ref 0–0.7)
EOSINOPHIL NFR BLD AUTO: 0.1 %
ERYTHROCYTE [DISTWIDTH] IN BLOOD BY AUTOMATED COUNT: 16.9 % (ref 11.5–14.5)
GLUCOSE SERPL-MCNC: 223 MG/DL (ref 74–99)
GLUCOSE UR STRIP.AUTO-MCNC: NORMAL MG/DL
HCT VFR BLD AUTO: 38.2 % (ref 41–52)
HGB BLD-MCNC: 12.4 G/DL (ref 13.5–17.5)
IMM GRANULOCYTES # BLD AUTO: 0.05 X10*3/UL (ref 0–0.7)
IMM GRANULOCYTES NFR BLD AUTO: 0.7 % (ref 0–0.9)
KETONES UR STRIP.AUTO-MCNC: NEGATIVE MG/DL
LACTATE SERPL-SCNC: 2.2 MMOL/L (ref 0.4–2)
LACTATE SERPL-SCNC: 2.6 MMOL/L (ref 0.4–2)
LEUKOCYTE ESTERASE UR QL STRIP.AUTO: ABNORMAL
LYMPHOCYTES # BLD AUTO: 0.4 X10*3/UL (ref 1.2–4.8)
LYMPHOCYTES NFR BLD AUTO: 5.5 %
MCH RBC QN AUTO: 28.8 PG (ref 26–34)
MCHC RBC AUTO-ENTMCNC: 32.5 G/DL (ref 32–36)
MCV RBC AUTO: 89 FL (ref 80–100)
MONOCYTES # BLD AUTO: 0.39 X10*3/UL (ref 0.1–1)
MONOCYTES NFR BLD AUTO: 5.3 %
NEUTROPHILS # BLD AUTO: 6.44 X10*3/UL (ref 1.2–7.7)
NEUTROPHILS NFR BLD AUTO: 88.1 %
NITRITE UR QL STRIP.AUTO: ABNORMAL
NRBC BLD-RTO: 0 /100 WBCS (ref 0–0)
PH UR STRIP.AUTO: 6.5 [PH]
PLATELET # BLD AUTO: 120 X10*3/UL (ref 150–450)
POTASSIUM SERPL-SCNC: 3.7 MMOL/L (ref 3.5–5.3)
PROT SERPL-MCNC: 6.7 G/DL (ref 6.4–8.2)
PROT UR STRIP.AUTO-MCNC: NEGATIVE MG/DL
RBC # BLD AUTO: 4.31 X10*6/UL (ref 4.5–5.9)
RBC # UR STRIP.AUTO: NEGATIVE MG/DL
RBC #/AREA URNS AUTO: ABNORMAL /HPF
SODIUM SERPL-SCNC: 130 MMOL/L (ref 136–145)
SP GR UR STRIP.AUTO: 1
UROBILINOGEN UR STRIP.AUTO-MCNC: NORMAL MG/DL
WBC # BLD AUTO: 7.3 X10*3/UL (ref 4.4–11.3)
WBC #/AREA URNS AUTO: ABNORMAL /HPF

## 2025-07-27 PROCEDURE — 87040 BLOOD CULTURE FOR BACTERIA: CPT | Mod: WESLAB

## 2025-07-27 PROCEDURE — 36415 COLL VENOUS BLD VENIPUNCTURE: CPT

## 2025-07-27 PROCEDURE — 85025 COMPLETE CBC W/AUTO DIFF WBC: CPT

## 2025-07-27 PROCEDURE — 83036 HEMOGLOBIN GLYCOSYLATED A1C: CPT | Mod: WESLAB | Performed by: INTERNAL MEDICINE

## 2025-07-27 PROCEDURE — 73552 X-RAY EXAM OF FEMUR 2/>: CPT | Mod: RT

## 2025-07-27 PROCEDURE — 87086 URINE CULTURE/COLONY COUNT: CPT | Mod: WESLAB

## 2025-07-27 PROCEDURE — 2500000002 HC RX 250 W HCPCS SELF ADMINISTERED DRUGS (ALT 637 FOR MEDICARE OP, ALT 636 FOR OP/ED)

## 2025-07-27 PROCEDURE — 93971 EXTREMITY STUDY: CPT

## 2025-07-27 PROCEDURE — 2060000001 HC INTERMEDIATE ICU ROOM DAILY

## 2025-07-27 PROCEDURE — 81001 URINALYSIS AUTO W/SCOPE: CPT

## 2025-07-27 PROCEDURE — 99222 1ST HOSP IP/OBS MODERATE 55: CPT | Performed by: INTERNAL MEDICINE

## 2025-07-27 PROCEDURE — 73552 X-RAY EXAM OF FEMUR 2/>: CPT | Mod: RIGHT SIDE | Performed by: RADIOLOGY

## 2025-07-27 PROCEDURE — 84484 ASSAY OF TROPONIN QUANT: CPT | Performed by: EMERGENCY MEDICINE

## 2025-07-27 PROCEDURE — 99285 EMERGENCY DEPT VISIT HI MDM: CPT | Mod: 25 | Performed by: EMERGENCY MEDICINE

## 2025-07-27 PROCEDURE — 93005 ELECTROCARDIOGRAM TRACING: CPT

## 2025-07-27 PROCEDURE — 2500000004 HC RX 250 GENERAL PHARMACY W/ HCPCS (ALT 636 FOR OP/ED)

## 2025-07-27 PROCEDURE — 2500000001 HC RX 250 WO HCPCS SELF ADMINISTERED DRUGS (ALT 637 FOR MEDICARE OP)

## 2025-07-27 PROCEDURE — 71045 X-RAY EXAM CHEST 1 VIEW: CPT | Performed by: RADIOLOGY

## 2025-07-27 PROCEDURE — 93971 EXTREMITY STUDY: CPT | Performed by: RADIOLOGY

## 2025-07-27 PROCEDURE — 80053 COMPREHEN METABOLIC PANEL: CPT

## 2025-07-27 PROCEDURE — 83605 ASSAY OF LACTIC ACID: CPT | Performed by: EMERGENCY MEDICINE

## 2025-07-27 PROCEDURE — 71045 X-RAY EXAM CHEST 1 VIEW: CPT

## 2025-07-27 RX ORDER — NAPROXEN SODIUM 220 MG/1
81 TABLET, FILM COATED ORAL ONCE
Status: COMPLETED | OUTPATIENT
Start: 2025-07-27 | End: 2025-07-27

## 2025-07-27 RX ORDER — CITALOPRAM 20 MG/1
20 TABLET ORAL NIGHTLY
Status: DISCONTINUED | OUTPATIENT
Start: 2025-07-27 | End: 2025-07-31

## 2025-07-27 RX ORDER — TOLTERODINE TARTRATE 1 MG/1
1 TABLET, EXTENDED RELEASE ORAL 2 TIMES DAILY
Status: DISCONTINUED | OUTPATIENT
Start: 2025-07-27 | End: 2025-07-27 | Stop reason: CLARIF

## 2025-07-27 RX ORDER — DEXTROSE MONOHYDRATE, SODIUM CHLORIDE, AND POTASSIUM CHLORIDE 50; 1.49; 4.5 G/1000ML; G/1000ML; G/1000ML
100 INJECTION, SOLUTION INTRAVENOUS CONTINUOUS
Status: CANCELLED | OUTPATIENT
Start: 2025-07-27

## 2025-07-27 RX ORDER — BACLOFEN 10 MG/1
20 TABLET ORAL 3 TIMES DAILY
Status: DISCONTINUED | OUTPATIENT
Start: 2025-07-27 | End: 2025-07-31

## 2025-07-27 RX ORDER — OXYBUTYNIN CHLORIDE 5 MG/1
5 TABLET ORAL 2 TIMES DAILY
Status: DISCONTINUED | OUTPATIENT
Start: 2025-07-27 | End: 2025-07-28

## 2025-07-27 RX ADMIN — PIPERACILLIN SODIUM AND TAZOBACTAM SODIUM 3.38 G: 3; .375 INJECTION, SOLUTION INTRAVENOUS at 19:06

## 2025-07-27 RX ADMIN — SODIUM CHLORIDE 1000 ML: 900 INJECTION, SOLUTION INTRAVENOUS at 19:06

## 2025-07-27 RX ADMIN — BACLOFEN 20 MG: 10 TABLET ORAL at 22:39

## 2025-07-27 RX ADMIN — OXYBUTYNIN CHLORIDE 5 MG: 5 TABLET ORAL at 22:39

## 2025-07-27 RX ADMIN — ASPIRIN 81 MG: 81 TABLET, CHEWABLE ORAL at 22:39

## 2025-07-27 ASSESSMENT — ACTIVITIES OF DAILY LIVING (ADL)
ASSISTIVE_DEVICE: TRANSFER BOARD
JUDGMENT_ADEQUATE_SAFELY_COMPLETE_DAILY_ACTIVITIES: YES
WALKS IN HOME: DEPENDENT
PATIENT'S MEMORY ADEQUATE TO SAFELY COMPLETE DAILY ACTIVITIES?: YES
TOILETING: DEPENDENT
ADEQUATE_TO_COMPLETE_ADL: YES
FEEDING YOURSELF: DEPENDENT
GROOMING: DEPENDENT
HEARING - LEFT EAR: FUNCTIONAL
BATHING: DEPENDENT
HEARING - RIGHT EAR: FUNCTIONAL
DRESSING YOURSELF: DEPENDENT

## 2025-07-27 ASSESSMENT — PAIN SCALES - GENERAL: PAINLEVEL_OUTOF10: 0 - NO PAIN

## 2025-07-27 ASSESSMENT — PAIN - FUNCTIONAL ASSESSMENT: PAIN_FUNCTIONAL_ASSESSMENT: 0-10

## 2025-07-27 ASSESSMENT — COGNITIVE AND FUNCTIONAL STATUS - GENERAL: PATIENT BASELINE BEDBOUND: YES

## 2025-07-27 NOTE — ED TRIAGE NOTES
Pt is a quadriplegic, Pts father is primary caregiver. Pts father [id number] a large red area on the back of the right thigh that travel to right hip, warm to touch.

## 2025-07-27 NOTE — H&P
History Of Present Illness  John Mora is a 54 y.o. male presenting with right thigh redness.  Patient is quadriplegic.  Father is the primary caregiver along with the mother.  He gets caregiver also who gives them back.  Yesterday it was father's time to get better and he noticed that patient has redness on the right thigh.  Last night patient started having chills this morning patient was brought in for the cellulitis of the right thigh.  Patient has a chronic ischial tuberosity pressure sore and under care of wound care.  Recently had to lesions on the toes were treated with antibiotic and has been healing well.  He gets his bowel prep regularly and changes his Kwan catheter routinely and uses antibiotic during the change of catheter.  That has prevented his UTIs.     Past Medical History  Medical History[1]    Surgical History  Surgical History[2]     Social History  He reports that he has never smoked. He has never used smokeless tobacco. He reports that he does not drink alcohol and does not use drugs.    Family History  Family History[3]     Allergies  Lorazepam    Review of Systems  Having chills  Physical Exam  Vitals reviewed.   Constitutional:       Appearance: Normal appearance.   HENT:      Head: Normocephalic and atraumatic.      Right Ear: Tympanic membrane, ear canal and external ear normal.      Left Ear: Tympanic membrane, ear canal and external ear normal.      Nose: Nose normal.      Mouth/Throat:      Pharynx: Oropharynx is clear.     Eyes:      Extraocular Movements: Extraocular movements intact.      Conjunctiva/sclera: Conjunctivae normal.      Pupils: Pupils are equal, round, and reactive to light.       Cardiovascular:      Rate and Rhythm: Tachycardia present. Rhythm irregular.      Pulses: Normal pulses.      Heart sounds: Normal heart sounds.   Pulmonary:      Effort: Pulmonary effort is normal.      Breath sounds: Normal breath sounds.   Abdominal:      General: Abdomen is flat.  "Bowel sounds are normal.      Palpations: Abdomen is soft.     Musculoskeletal:      Cervical back: Normal range of motion and neck supple.     Skin:     General: Skin is warm and dry.      Findings: Rash present.      Comments:   Lesion on the left big toe   extensive erythema involving the right thigh     Neurological:      Mental Status: He is alert and oriented to person, place, and time. Mental status is at baseline.      Comments: Patient has quadriplegia   Psychiatric:         Mood and Affect: Mood normal.          Last Recorded Vitals  Blood pressure 113/72, pulse (!) 103, temperature 36.5 °C (97.7 °F), temperature source Tympanic, resp. rate 18, height 1.854 m (6' 1\"), weight 109 kg (240 lb), SpO2 96%.    Relevant Results      Results for orders placed or performed during the hospital encounter of 07/27/25 (from the past 24 hours)   CBC and Auto Differential   Result Value Ref Range    WBC 7.3 4.4 - 11.3 x10*3/uL    nRBC 0.0 0.0 - 0.0 /100 WBCs    RBC 4.31 (L) 4.50 - 5.90 x10*6/uL    Hemoglobin 12.4 (L) 13.5 - 17.5 g/dL    Hematocrit 38.2 (L) 41.0 - 52.0 %    MCV 89 80 - 100 fL    MCH 28.8 26.0 - 34.0 pg    MCHC 32.5 32.0 - 36.0 g/dL    RDW 16.9 (H) 11.5 - 14.5 %    Platelets 120 (L) 150 - 450 x10*3/uL    Neutrophils % 88.1 40.0 - 80.0 %    Immature Granulocytes %, Automated 0.7 0.0 - 0.9 %    Lymphocytes % 5.5 13.0 - 44.0 %    Monocytes % 5.3 2.0 - 10.0 %    Eosinophils % 0.1 0.0 - 6.0 %    Basophils % 0.3 0.0 - 2.0 %    Neutrophils Absolute 6.44 1.20 - 7.70 x10*3/uL    Immature Granulocytes Absolute, Automated 0.05 0.00 - 0.70 x10*3/uL    Lymphocytes Absolute 0.40 (L) 1.20 - 4.80 x10*3/uL    Monocytes Absolute 0.39 0.10 - 1.00 x10*3/uL    Eosinophils Absolute 0.01 0.00 - 0.70 x10*3/uL    Basophils Absolute 0.02 0.00 - 0.10 x10*3/uL   Comprehensive metabolic panel   Result Value Ref Range    Glucose 223 (H) 74 - 99 mg/dL    Sodium 130 (L) 136 - 145 mmol/L    Potassium 3.7 3.5 - 5.3 mmol/L    Chloride 97 " (L) 98 - 107 mmol/L    Bicarbonate 25 21 - 32 mmol/L    Anion Gap 12 10 - 20 mmol/L    Urea Nitrogen 12 6 - 23 mg/dL    Creatinine 0.61 0.50 - 1.30 mg/dL    eGFR >90 >60 mL/min/1.73m*2    Calcium 8.6 8.6 - 10.3 mg/dL    Albumin 3.5 3.4 - 5.0 g/dL    Alkaline Phosphatase 63 33 - 120 U/L    Total Protein 6.7 6.4 - 8.2 g/dL    AST 52 (H) 9 - 39 U/L    Bilirubin, Total 1.0 0.0 - 1.2 mg/dL    ALT 53 (H) 10 - 52 U/L   Lactate   Result Value Ref Range    Lactate 2.6 (H) 0.4 - 2.0 mmol/L   Troponin I, High Sensitivity   Result Value Ref Range    Troponin I, High Sensitivity 6 0 - 20 ng/L   Urinalysis with Reflex Culture and Microscopic   Result Value Ref Range    Color, Urine Colorless (N) Light-Yellow, Yellow, Dark-Yellow    Appearance, Urine Clear Clear    Specific Gravity, Urine 1.005 1.005 - 1.035    pH, Urine 6.5 5.0, 5.5, 6.0, 6.5, 7.0, 7.5, 8.0    Protein, Urine NEGATIVE NEGATIVE, 10 (TRACE), 20 (TRACE) mg/dL    Glucose, Urine Normal Normal mg/dL    Blood, Urine NEGATIVE NEGATIVE mg/dL    Ketones, Urine NEGATIVE NEGATIVE mg/dL    Bilirubin, Urine NEGATIVE NEGATIVE mg/dL    Urobilinogen, Urine Normal Normal mg/dL    Nitrite, Urine 1+ (A) NEGATIVE    Leukocyte Esterase, Urine 250 Jordi/uL (A) NEGATIVE   Microscopic Only, Urine   Result Value Ref Range    WBC, Urine 11-20 (A) 1-5, NONE /HPF    RBC, Urine NONE NONE, 1-2, 3-5 /HPF    Bacteria, Urine 1+ (A) NONE SEEN /HPF     XR chest 1 view  Result Date: 7/27/2025  Interpreted By:  Reanna Verma, STUDY: Chest, single AP view.   INDICATION: Signs/Symptoms:Malaise, new onset atrial flutter.   COMPARISON: None.   ACCESSION NUMBER(S): [id number]   ORDERING CLINICIAN: LESLEY LIVINGSTON   FINDINGS: The cardiac silhouette size is within normal limits. There is no focal consolidation, edema or pneumothorax. Questionable trace right pleural effusion with blunting of the costophrenic angle. No acute osseous abnormality.       1. Questionable trace right pleural effusion.   MACRO:  None.   Signed by: Reanna Verma 7/27/2025 6:42 PM Dictation workstation:   [id number]    XR femur right 2+ views  Result Date: 7/27/2025  Interpreted By:  Reanna Verma, STUDY: Right femur, two views.   INDICATION: Signs/Symptoms:Atraumatic pain and swelling.   COMPARISON: CT pelvis 01/03/2023.   ACCESSION NUMBER(S): [id number]   ORDERING CLINICIAN: LESLEY LIVINGSTON   FINDINGS: No acute fracture or malalignment. Severe right hip osteoarthrosis with joint space loss and osteophytes. Moderate to severe right knee osteoarthrosis as well. Bones appear demineralized. Extensive calcific deposits are visualized in the lateral aspect of the femoral neck and inferior aspect of the ischial tuberosity likely representing hydroxyapatite deposition, also noted on previous CT.       1. Severe right hip and moderate to severe right knee osteoarthrosis. 2. Extensive calcific deposits are visualized in the lateral aspect of the femoral neck and inferior aspect of the ischial tuberosity likely representing hydroxyapatite deposition, also noted on previous CT. 3. No acute fracture or malalignment.   MACRO:   None.   Signed by: Reanna Verma 7/27/2025 6:41 PM Dictation workstation:   [id number]           Assessment & Plan  Atrial flutter, unspecified type (Multi)    Urinary tract infection    Cellulitis of right lower extremity    Quadriplegia, C1-C4, complete (Multi)    Muscle spasticity    GERD (gastroesophageal reflux disease)    Bilateral great toes ulcers (Multi)    Neurogenic bladder    Neurogenic bowel    History of aplastic anemia    Pressure ulcer      Will start IV antibiotic for cellulitis and UTI  Patient has history of aplastic anemia from the antibiotic use  Will consult ID  Consult wound care for further wounds on the toe and the pressure sore on the left ischium  Patient has history of atrial flutter in the face of sepsis many years ago.  Atrial flutter is probably is related to autonomic dysfunction in the  face of sepsis  Will check 2D echo  Consult cardiology  Anticoagulants per cardiology  Continue Kwan catheter care for his neurogenic bladder  Bowel preps as he does at home  See orders for details    I spent  minutes in the professional and overall care of this patient.      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

## 2025-07-27 NOTE — PROGRESS NOTES
Attestation/Supervisory note for JOSEPH Schilling      The patient is a 54-year-old male presenting to the emergency department for evaluation of possible cellulitis of the right thigh.  The patient is a quadriplegic.  He states that he does not know if his symptoms started today or if they just noticed it today but he states that there was some redness and firmness of his right thigh.  He states that his family members noticed it.  He states that he does not have any sensation and does not have any pain.  He denies any headache or visual changes.  No chest pain or shortness of breath.  No abdominal pain.  No nausea, vomiting or diarrhea.  No urinary complaints.  No fever or chills.  All pertinent positives and negatives are recorded above.  All other systems reviewed and otherwise negative.  Vital signs with borderline tachycardia but otherwise within normal limits.  Physical exam with a well-nourished well-developed male in no acute distress.  HEENT exam with dry mucous membranes but otherwise unremarkable.  He does not have any evidence of airway compromise or respiratory distress.  He does have an irregular heartbeat.  he is able to converse without difficulty.  Abdomen is soft.  He does have paralysis of all 4 extremities.  He does have warmth with erythema and firmness of the right lateral and posterior thigh.  No visible or palpable bony deformities on exam.      EKG with atrial flutter at 98 bpm, variable AV block, normal axis, normal voltage, normal ST segment, normal T waves      The patient and his father do report that he has a remote history of atrial flutter.  It was reportedly more than 15 years ago and he has not had any recurrence since.  He does not take any blood thinners other than a daily aspirin.      Diagnostic labs were ordered but the results were pending at the time of my departure.      Lower extremity venous duplex right    (Results Pending)   XR femur right 2+ views    (Results Pending)   XR  chest 1 view    (Results Pending)        The patient does not have any evidence of airway compromise or respiratory distress on exam.  He does have atrial flutter on his EKG and he does report a remote history of atrial flutter but is currently not anticoagulated other than a daily aspirin.  He does not have any evidence of hemodynamic instability other than he is mildly tachycardic.  He is well-perfused on exam and does not have any other evidence of sepsis at this time.  Results of the diagnostic labs and radiology interpretation of the diagnostic imaging was pending at the time of my departure.      JOSEPH Schilling will continue to manage the patient primarily.  Anticipate disposition based on the results of the diagnostic studies that are pending at this time.  Anticipate that the patient may need to be admitted for further management.      Impression/diagnosis:  Atrial flutter with variable AV block      I personally saw the patient and made/approve the management plan and take responsibility for the patient management.      I independently interpreted the following study (S) EKG and diagnostic labs      I personally discussed the patient's management with the patient and his father      I reviewed the results of the diagnostic labs and diagnostic imaging.  Formal radiology read was completed by the radiologist.      Ranjana Persaud MD

## 2025-07-28 ENCOUNTER — APPOINTMENT (OUTPATIENT)
Dept: CARDIOLOGY | Facility: HOSPITAL | Age: 55
End: 2025-07-28
Payer: COMMERCIAL

## 2025-07-28 VITALS
DIASTOLIC BLOOD PRESSURE: 70 MMHG | WEIGHT: 228.84 LBS | SYSTOLIC BLOOD PRESSURE: 118 MMHG | HEIGHT: 73 IN | OXYGEN SATURATION: 95 % | BODY MASS INDEX: 30.33 KG/M2 | HEART RATE: 92 BPM | TEMPERATURE: 97.9 F | RESPIRATION RATE: 17 BRPM

## 2025-07-28 LAB
EST. AVERAGE GLUCOSE BLD GHB EST-MCNC: 123 MG/DL
GLUCOSE BLD MANUAL STRIP-MCNC: 159 MG/DL (ref 74–99)
GLUCOSE BLD MANUAL STRIP-MCNC: 182 MG/DL (ref 74–99)
GLUCOSE BLD MANUAL STRIP-MCNC: 207 MG/DL (ref 74–99)
HBA1C MFR BLD: 5.9 % (ref ?–5.7)

## 2025-07-28 PROCEDURE — 82947 ASSAY GLUCOSE BLOOD QUANT: CPT

## 2025-07-28 PROCEDURE — 2500000002 HC RX 250 W HCPCS SELF ADMINISTERED DRUGS (ALT 637 FOR MEDICARE OP, ALT 636 FOR OP/ED): Performed by: INTERNAL MEDICINE

## 2025-07-28 PROCEDURE — 2500000001 HC RX 250 WO HCPCS SELF ADMINISTERED DRUGS (ALT 637 FOR MEDICARE OP): Performed by: INTERNAL MEDICINE

## 2025-07-28 PROCEDURE — 2500000001 HC RX 250 WO HCPCS SELF ADMINISTERED DRUGS (ALT 637 FOR MEDICARE OP)

## 2025-07-28 PROCEDURE — 2500000004 HC RX 250 GENERAL PHARMACY W/ HCPCS (ALT 636 FOR OP/ED): Performed by: INTERNAL MEDICINE

## 2025-07-28 PROCEDURE — 87077 CULTURE AEROBIC IDENTIFY: CPT | Mod: WESLAB | Performed by: INTERNAL MEDICINE

## 2025-07-28 PROCEDURE — 99222 1ST HOSP IP/OBS MODERATE 55: CPT | Performed by: NURSE PRACTITIONER

## 2025-07-28 PROCEDURE — 99233 SBSQ HOSP IP/OBS HIGH 50: CPT | Performed by: INTERNAL MEDICINE

## 2025-07-28 PROCEDURE — 2500000004 HC RX 250 GENERAL PHARMACY W/ HCPCS (ALT 636 FOR OP/ED): Performed by: PHARMACY

## 2025-07-28 PROCEDURE — 2060000001 HC INTERMEDIATE ICU ROOM DAILY

## 2025-07-28 PROCEDURE — 2500000002 HC RX 250 W HCPCS SELF ADMINISTERED DRUGS (ALT 637 FOR MEDICARE OP, ALT 636 FOR OP/ED)

## 2025-07-28 RX ORDER — ACETAMINOPHEN 160 MG/5ML
650 SOLUTION ORAL EVERY 4 HOURS PRN
Status: DISCONTINUED | OUTPATIENT
Start: 2025-07-28 | End: 2025-07-31 | Stop reason: HOSPADM

## 2025-07-28 RX ORDER — ASPIRIN 81 MG/1
81 TABLET ORAL DAILY
Status: DISCONTINUED | OUTPATIENT
Start: 2025-07-28 | End: 2025-07-31 | Stop reason: HOSPADM

## 2025-07-28 RX ORDER — OXYBUTYNIN CHLORIDE 5 MG/1
5 TABLET ORAL 3 TIMES DAILY
Status: DISCONTINUED | OUTPATIENT
Start: 2025-07-28 | End: 2025-07-31 | Stop reason: HOSPADM

## 2025-07-28 RX ORDER — DOCUSATE SODIUM 100 MG/1
100 CAPSULE, LIQUID FILLED ORAL 2 TIMES DAILY
Status: DISCONTINUED | OUTPATIENT
Start: 2025-07-28 | End: 2025-07-31 | Stop reason: HOSPADM

## 2025-07-28 RX ORDER — BISACODYL 10 MG/1
10 SUPPOSITORY RECTAL DAILY PRN
Status: DISCONTINUED | OUTPATIENT
Start: 2025-07-28 | End: 2025-07-31 | Stop reason: HOSPADM

## 2025-07-28 RX ORDER — ENOXAPARIN SODIUM 100 MG/ML
40 INJECTION SUBCUTANEOUS
Status: DISCONTINUED | OUTPATIENT
Start: 2025-07-28 | End: 2025-07-31 | Stop reason: HOSPADM

## 2025-07-28 RX ORDER — ACETAMINOPHEN 650 MG/1
650 SUPPOSITORY RECTAL EVERY 4 HOURS PRN
Status: DISCONTINUED | OUTPATIENT
Start: 2025-07-28 | End: 2025-07-31 | Stop reason: HOSPADM

## 2025-07-28 RX ORDER — ACETAMINOPHEN 325 MG/1
650 TABLET ORAL EVERY 4 HOURS PRN
Status: DISCONTINUED | OUTPATIENT
Start: 2025-07-28 | End: 2025-07-31 | Stop reason: HOSPADM

## 2025-07-28 RX ORDER — BACLOFEN 10 MG/1
20 TABLET ORAL 3 TIMES DAILY
Status: DISCONTINUED | OUTPATIENT
Start: 2025-07-28 | End: 2025-07-28 | Stop reason: SDUPTHER

## 2025-07-28 RX ORDER — GUAIFENESIN/DEXTROMETHORPHAN 100-10MG/5
5 SYRUP ORAL EVERY 4 HOURS PRN
Status: DISCONTINUED | OUTPATIENT
Start: 2025-07-28 | End: 2025-07-31 | Stop reason: HOSPADM

## 2025-07-28 RX ORDER — GUAIFENESIN 600 MG/1
600 TABLET, EXTENDED RELEASE ORAL EVERY 12 HOURS PRN
Status: DISCONTINUED | OUTPATIENT
Start: 2025-07-28 | End: 2025-07-31 | Stop reason: HOSPADM

## 2025-07-28 RX ORDER — ONDANSETRON HYDROCHLORIDE 2 MG/ML
4 INJECTION, SOLUTION INTRAVENOUS EVERY 8 HOURS PRN
Status: DISCONTINUED | OUTPATIENT
Start: 2025-07-28 | End: 2025-07-31 | Stop reason: HOSPADM

## 2025-07-28 RX ORDER — CITALOPRAM 20 MG/1
20 TABLET ORAL DAILY
Status: DISCONTINUED | OUTPATIENT
Start: 2025-07-29 | End: 2025-07-28 | Stop reason: SDUPTHER

## 2025-07-28 RX ORDER — POLYETHYLENE GLYCOL 3350 17 G/17G
17 POWDER, FOR SOLUTION ORAL DAILY
Status: DISCONTINUED | OUTPATIENT
Start: 2025-07-28 | End: 2025-07-31 | Stop reason: HOSPADM

## 2025-07-28 RX ORDER — DEXTROSE 50 % IN WATER (D50W) INTRAVENOUS SYRINGE
12.5
Status: DISCONTINUED | OUTPATIENT
Start: 2025-07-28 | End: 2025-07-31 | Stop reason: HOSPADM

## 2025-07-28 RX ORDER — SODIUM CHLORIDE 9 MG/ML
100 INJECTION, SOLUTION INTRAVENOUS CONTINUOUS
Status: ACTIVE | OUTPATIENT
Start: 2025-07-28 | End: 2025-07-29

## 2025-07-28 RX ORDER — VANCOMYCIN 1.75 G/350ML
1250 INJECTION, SOLUTION INTRAVENOUS EVERY 12 HOURS
Status: DISCONTINUED | OUTPATIENT
Start: 2025-07-28 | End: 2025-07-30

## 2025-07-28 RX ORDER — VANCOMYCIN HYDROCHLORIDE 1 G/20ML
INJECTION, POWDER, LYOPHILIZED, FOR SOLUTION INTRAVENOUS DAILY PRN
Status: DISCONTINUED | OUTPATIENT
Start: 2025-07-28 | End: 2025-07-30

## 2025-07-28 RX ORDER — PANTOPRAZOLE SODIUM 40 MG/10ML
40 INJECTION, POWDER, LYOPHILIZED, FOR SOLUTION INTRAVENOUS
Status: DISCONTINUED | OUTPATIENT
Start: 2025-07-28 | End: 2025-07-31 | Stop reason: HOSPADM

## 2025-07-28 RX ORDER — PANTOPRAZOLE SODIUM 40 MG/1
40 TABLET, DELAYED RELEASE ORAL
Status: DISCONTINUED | OUTPATIENT
Start: 2025-07-28 | End: 2025-07-31 | Stop reason: HOSPADM

## 2025-07-28 RX ORDER — TALC
3 POWDER (GRAM) TOPICAL NIGHTLY PRN
Status: DISCONTINUED | OUTPATIENT
Start: 2025-07-28 | End: 2025-07-31 | Stop reason: HOSPADM

## 2025-07-28 RX ORDER — DEXTROSE 50 % IN WATER (D50W) INTRAVENOUS SYRINGE
25
Status: DISCONTINUED | OUTPATIENT
Start: 2025-07-28 | End: 2025-07-31 | Stop reason: HOSPADM

## 2025-07-28 RX ORDER — ONDANSETRON 4 MG/1
4 TABLET, FILM COATED ORAL EVERY 8 HOURS PRN
Status: DISCONTINUED | OUTPATIENT
Start: 2025-07-28 | End: 2025-07-31 | Stop reason: HOSPADM

## 2025-07-28 RX ORDER — INSULIN LISPRO 100 [IU]/ML
0-5 INJECTION, SOLUTION INTRAVENOUS; SUBCUTANEOUS
Status: DISCONTINUED | OUTPATIENT
Start: 2025-07-28 | End: 2025-07-31 | Stop reason: HOSPADM

## 2025-07-28 RX ADMIN — CITALOPRAM HYDROBROMIDE 20 MG: 20 TABLET ORAL at 21:39

## 2025-07-28 RX ADMIN — OXYBUTYNIN CHLORIDE 5 MG: 5 TABLET ORAL at 10:06

## 2025-07-28 RX ADMIN — ENOXAPARIN SODIUM 40 MG: 100 INJECTION SUBCUTANEOUS at 10:05

## 2025-07-28 RX ADMIN — ASPIRIN 81 MG: 81 TABLET, DELAYED RELEASE ORAL at 10:06

## 2025-07-28 RX ADMIN — INSULIN LISPRO 2 UNITS: 100 INJECTION, SOLUTION INTRAVENOUS; SUBCUTANEOUS at 18:15

## 2025-07-28 RX ADMIN — BACLOFEN 20 MG: 10 TABLET ORAL at 21:39

## 2025-07-28 RX ADMIN — BACLOFEN 20 MG: 10 TABLET ORAL at 14:07

## 2025-07-28 RX ADMIN — SODIUM CHLORIDE 100 ML/HR: 900 INJECTION, SOLUTION INTRAVENOUS at 10:52

## 2025-07-28 RX ADMIN — PANTOPRAZOLE SODIUM 40 MG: 40 TABLET, DELAYED RELEASE ORAL at 10:06

## 2025-07-28 RX ADMIN — PIPERACILLIN SODIUM AND TAZOBACTAM SODIUM 4.5 G: 4; .5 INJECTION, SOLUTION INTRAVENOUS at 16:17

## 2025-07-28 RX ADMIN — SODIUM CHLORIDE 100 ML/HR: 900 INJECTION, SOLUTION INTRAVENOUS at 20:29

## 2025-07-28 RX ADMIN — OXYBUTYNIN CHLORIDE 5 MG: 5 TABLET ORAL at 21:39

## 2025-07-28 RX ADMIN — INSULIN LISPRO 1 UNITS: 100 INJECTION, SOLUTION INTRAVENOUS; SUBCUTANEOUS at 13:55

## 2025-07-28 RX ADMIN — BACLOFEN 20 MG: 10 TABLET ORAL at 10:06

## 2025-07-28 RX ADMIN — VANCOMYCIN 1250 MG: 1.75 INJECTION, SOLUTION INTRAVENOUS at 21:40

## 2025-07-28 RX ADMIN — PIPERACILLIN SODIUM AND TAZOBACTAM SODIUM 4.5 G: 4; .5 INJECTION, SOLUTION INTRAVENOUS at 21:08

## 2025-07-28 RX ADMIN — CITALOPRAM HYDROBROMIDE 20 MG: 20 TABLET ORAL at 00:21

## 2025-07-28 RX ADMIN — PIPERACILLIN SODIUM AND TAZOBACTAM SODIUM 4.5 G: 4; .5 INJECTION, SOLUTION INTRAVENOUS at 11:01

## 2025-07-28 SDOH — ECONOMIC STABILITY: FOOD INSECURITY: HOW HARD IS IT FOR YOU TO PAY FOR THE VERY BASICS LIKE FOOD, HOUSING, MEDICAL CARE, AND HEATING?: NOT HARD AT ALL

## 2025-07-28 SDOH — SOCIAL STABILITY: SOCIAL INSECURITY: ABUSE: ADULT

## 2025-07-28 SDOH — HEALTH STABILITY: PHYSICAL HEALTH: ON AVERAGE, HOW MANY DAYS PER WEEK DO YOU ENGAGE IN MODERATE TO STRENUOUS EXERCISE (LIKE A BRISK WALK)?: 0 DAYS

## 2025-07-28 SDOH — SOCIAL STABILITY: SOCIAL INSECURITY: ARE YOU OR HAVE YOU BEEN THREATENED OR ABUSED PHYSICALLY, EMOTIONALLY, OR SEXUALLY BY ANYONE?: NO

## 2025-07-28 SDOH — SOCIAL STABILITY: SOCIAL INSECURITY: WITHIN THE LAST YEAR, HAVE YOU BEEN HUMILIATED OR EMOTIONALLY ABUSED IN OTHER WAYS BY YOUR PARTNER OR EX-PARTNER?: NO

## 2025-07-28 SDOH — SOCIAL STABILITY: SOCIAL INSECURITY: DO YOU FEEL UNSAFE GOING BACK TO THE PLACE WHERE YOU ARE LIVING?: NO

## 2025-07-28 SDOH — ECONOMIC STABILITY: HOUSING INSECURITY: AT ANY TIME IN THE PAST 12 MONTHS, WERE YOU HOMELESS OR LIVING IN A SHELTER (INCLUDING NOW)?: NO

## 2025-07-28 SDOH — SOCIAL STABILITY: SOCIAL NETWORK
DO YOU BELONG TO ANY CLUBS OR ORGANIZATIONS SUCH AS CHURCH GROUPS, UNIONS, FRATERNAL OR ATHLETIC GROUPS, OR SCHOOL GROUPS?: NO

## 2025-07-28 SDOH — HEALTH STABILITY: MENTAL HEALTH: HOW OFTEN DO YOU HAVE A DRINK CONTAINING ALCOHOL?: NEVER

## 2025-07-28 SDOH — SOCIAL STABILITY: SOCIAL INSECURITY
WITHIN THE LAST YEAR, HAVE YOU BEEN RAPED OR FORCED TO HAVE ANY KIND OF SEXUAL ACTIVITY BY YOUR PARTNER OR EX-PARTNER?: NO

## 2025-07-28 SDOH — ECONOMIC STABILITY: HOUSING INSECURITY: IN THE PAST 12 MONTHS, HOW MANY TIMES HAVE YOU MOVED WHERE YOU WERE LIVING?: 0

## 2025-07-28 SDOH — ECONOMIC STABILITY: FOOD INSECURITY: WITHIN THE PAST 12 MONTHS, THE FOOD YOU BOUGHT JUST DIDN'T LAST AND YOU DIDN'T HAVE MONEY TO GET MORE.: NEVER TRUE

## 2025-07-28 SDOH — SOCIAL STABILITY: SOCIAL NETWORK: HOW OFTEN DO YOU ATTEND MEETINGS OF THE CLUBS OR ORGANIZATIONS YOU BELONG TO?: NEVER

## 2025-07-28 SDOH — SOCIAL STABILITY: SOCIAL INSECURITY: ARE YOU MARRIED, WIDOWED, DIVORCED, SEPARATED, NEVER MARRIED, OR LIVING WITH A PARTNER?: NEVER MARRIED

## 2025-07-28 SDOH — SOCIAL STABILITY: SOCIAL INSECURITY: WERE YOU ABLE TO COMPLETE ALL THE BEHAVIORAL HEALTH SCREENINGS?: YES

## 2025-07-28 SDOH — SOCIAL STABILITY: SOCIAL INSECURITY: ARE THERE ANY APPARENT SIGNS OF INJURIES/BEHAVIORS THAT COULD BE RELATED TO ABUSE/NEGLECT?: NO

## 2025-07-28 SDOH — ECONOMIC STABILITY: HOUSING INSECURITY: IN THE LAST 12 MONTHS, WAS THERE A TIME WHEN YOU WERE NOT ABLE TO PAY THE MORTGAGE OR RENT ON TIME?: NO

## 2025-07-28 SDOH — ECONOMIC STABILITY: FOOD INSECURITY: WITHIN THE PAST 12 MONTHS, YOU WORRIED THAT YOUR FOOD WOULD RUN OUT BEFORE YOU GOT THE MONEY TO BUY MORE.: NEVER TRUE

## 2025-07-28 SDOH — SOCIAL STABILITY: SOCIAL INSECURITY
WITHIN THE LAST YEAR, HAVE YOU BEEN KICKED, HIT, SLAPPED, OR OTHERWISE PHYSICALLY HURT BY YOUR PARTNER OR EX-PARTNER?: NO

## 2025-07-28 SDOH — HEALTH STABILITY: MENTAL HEALTH
DO YOU FEEL STRESS - TENSE, RESTLESS, NERVOUS, OR ANXIOUS, OR UNABLE TO SLEEP AT NIGHT BECAUSE YOUR MIND IS TROUBLED ALL THE TIME - THESE DAYS?: NOT AT ALL

## 2025-07-28 SDOH — SOCIAL STABILITY: SOCIAL INSECURITY: WITHIN THE LAST YEAR, HAVE YOU BEEN AFRAID OF YOUR PARTNER OR EX-PARTNER?: NO

## 2025-07-28 SDOH — HEALTH STABILITY: MENTAL HEALTH: HOW OFTEN DO YOU HAVE SIX OR MORE DRINKS ON ONE OCCASION?: NEVER

## 2025-07-28 SDOH — ECONOMIC STABILITY: INCOME INSECURITY: IN THE PAST 12 MONTHS HAS THE ELECTRIC, GAS, OIL, OR WATER COMPANY THREATENED TO SHUT OFF SERVICES IN YOUR HOME?: NO

## 2025-07-28 SDOH — HEALTH STABILITY: PHYSICAL HEALTH
HOW OFTEN DO YOU NEED TO HAVE SOMEONE HELP YOU WHEN YOU READ INSTRUCTIONS, PAMPHLETS, OR OTHER WRITTEN MATERIAL FROM YOUR DOCTOR OR PHARMACY?: SOMETIMES

## 2025-07-28 SDOH — SOCIAL STABILITY: SOCIAL INSECURITY: HAS ANYONE EVER THREATENED TO HURT YOUR FAMILY OR YOUR PETS?: NO

## 2025-07-28 SDOH — HEALTH STABILITY: PHYSICAL HEALTH: ON AVERAGE, HOW MANY MINUTES DO YOU ENGAGE IN EXERCISE AT THIS LEVEL?: 0 MIN

## 2025-07-28 SDOH — SOCIAL STABILITY: SOCIAL NETWORK: IN A TYPICAL WEEK, HOW MANY TIMES DO YOU TALK ON THE PHONE WITH FAMILY, FRIENDS, OR NEIGHBORS?: THREE TIMES A WEEK

## 2025-07-28 SDOH — HEALTH STABILITY: MENTAL HEALTH: HOW MANY DRINKS CONTAINING ALCOHOL DO YOU HAVE ON A TYPICAL DAY WHEN YOU ARE DRINKING?: PATIENT DOES NOT DRINK

## 2025-07-28 SDOH — SOCIAL STABILITY: SOCIAL NETWORK: HOW OFTEN DO YOU GET TOGETHER WITH FRIENDS OR RELATIVES?: NEVER

## 2025-07-28 SDOH — SOCIAL STABILITY: SOCIAL INSECURITY: HAVE YOU HAD THOUGHTS OF HARMING ANYONE ELSE?: NO

## 2025-07-28 SDOH — SOCIAL STABILITY: SOCIAL INSECURITY: ARE YOU MARRIED, WIDOWED, DIVORCED, SEPARATED, NEVER MARRIED, OR LIVING WITH A PARTNER?: PATIENT UNABLE TO ANSWER

## 2025-07-28 SDOH — SOCIAL STABILITY: SOCIAL NETWORK: HOW OFTEN DO YOU ATTEND CHURCH OR RELIGIOUS SERVICES?: NEVER

## 2025-07-28 SDOH — SOCIAL STABILITY: SOCIAL INSECURITY: HAVE YOU HAD ANY THOUGHTS OF HARMING ANYONE ELSE?: NO

## 2025-07-28 SDOH — ECONOMIC STABILITY: TRANSPORTATION INSECURITY: IN THE PAST 12 MONTHS, HAS LACK OF TRANSPORTATION KEPT YOU FROM MEDICAL APPOINTMENTS OR FROM GETTING MEDICATIONS?: NO

## 2025-07-28 SDOH — SOCIAL STABILITY: SOCIAL INSECURITY: DOES ANYONE TRY TO KEEP YOU FROM HAVING/CONTACTING OTHER FRIENDS OR DOING THINGS OUTSIDE YOUR HOME?: NO

## 2025-07-28 SDOH — SOCIAL STABILITY: SOCIAL INSECURITY: DO YOU FEEL ANYONE HAS EXPLOITED OR TAKEN ADVANTAGE OF YOU FINANCIALLY OR OF YOUR PERSONAL PROPERTY?: NO

## 2025-07-28 SDOH — HEALTH STABILITY: MENTAL HEALTH: EXPERIENCED ANY OF THE FOLLOWING LIFE EVENTS: OTHER (COMMENT)

## 2025-07-28 SDOH — SOCIAL STABILITY: SOCIAL INSECURITY: POSSIBLE ABUSE REPORTED TO:: OTHER (COMMENT)

## 2025-07-28 ASSESSMENT — ENCOUNTER SYMPTOMS
ALLERGIC/IMMUNOLOGIC NEGATIVE: 1
FEVER: 1
EYES NEGATIVE: 1
CARDIOVASCULAR NEGATIVE: 1
RESPIRATORY NEGATIVE: 1
CHILLS: 1
WOUND: 1
ENDOCRINE NEGATIVE: 1
GASTROINTESTINAL NEGATIVE: 1
PSYCHIATRIC NEGATIVE: 1
FEVER: 0
HEMATOLOGIC/LYMPHATIC NEGATIVE: 1
NEUROLOGICAL NEGATIVE: 1
MUSCULOSKELETAL NEGATIVE: 1

## 2025-07-28 ASSESSMENT — COGNITIVE AND FUNCTIONAL STATUS - GENERAL
HELP NEEDED FOR BATHING: TOTAL
STANDING UP FROM CHAIR USING ARMS: TOTAL
DRESSING REGULAR UPPER BODY CLOTHING: TOTAL
DAILY ACTIVITIY SCORE: 6
HELP NEEDED FOR BATHING: TOTAL
WALKING IN HOSPITAL ROOM: TOTAL
STANDING UP FROM CHAIR USING ARMS: TOTAL
EATING MEALS: TOTAL
PERSONAL GROOMING: TOTAL
DRESSING REGULAR LOWER BODY CLOTHING: TOTAL
TURNING FROM BACK TO SIDE WHILE IN FLAT BAD: TOTAL
WALKING IN HOSPITAL ROOM: TOTAL
MOBILITY SCORE: 6
PERSONAL GROOMING: TOTAL
MOVING TO AND FROM BED TO CHAIR: TOTAL
EATING MEALS: TOTAL
TOILETING: TOTAL
DRESSING REGULAR LOWER BODY CLOTHING: TOTAL
MOVING FROM LYING ON BACK TO SITTING ON SIDE OF FLAT BED WITH BEDRAILS: TOTAL
TOILETING: TOTAL
MOBILITY SCORE: 6
MOVING TO AND FROM BED TO CHAIR: TOTAL
MOVING FROM LYING ON BACK TO SITTING ON SIDE OF FLAT BED WITH BEDRAILS: TOTAL
CLIMB 3 TO 5 STEPS WITH RAILING: TOTAL
TURNING FROM BACK TO SIDE WHILE IN FLAT BAD: TOTAL
DRESSING REGULAR UPPER BODY CLOTHING: TOTAL
CLIMB 3 TO 5 STEPS WITH RAILING: TOTAL
DAILY ACTIVITIY SCORE: 6

## 2025-07-28 ASSESSMENT — PAIN SCALES - GENERAL
PAINLEVEL_OUTOF10: 0 - NO PAIN

## 2025-07-28 ASSESSMENT — PATIENT HEALTH QUESTIONNAIRE - PHQ9
SUM OF ALL RESPONSES TO PHQ9 QUESTIONS 1 & 2: 0
2. FEELING DOWN, DEPRESSED OR HOPELESS: NOT AT ALL
1. LITTLE INTEREST OR PLEASURE IN DOING THINGS: NOT AT ALL

## 2025-07-28 ASSESSMENT — ACTIVITIES OF DAILY LIVING (ADL)
LACK_OF_TRANSPORTATION: NO
LACK_OF_TRANSPORTATION: NO

## 2025-07-28 ASSESSMENT — LIFESTYLE VARIABLES
AUDIT-C TOTAL SCORE: 0
PRESCIPTION_ABUSE_PAST_12_MONTHS: NO
HOW OFTEN DO YOU HAVE 6 OR MORE DRINKS ON ONE OCCASION: NEVER
HOW MANY STANDARD DRINKS CONTAINING ALCOHOL DO YOU HAVE ON A TYPICAL DAY: PATIENT DOES NOT DRINK
SKIP TO QUESTIONS 9-10: 1
AUDIT-C TOTAL SCORE: 0
HOW OFTEN DO YOU HAVE A DRINK CONTAINING ALCOHOL: NEVER
SUBSTANCE_ABUSE_PAST_12_MONTHS: NO
SKIP TO QUESTIONS 9-10: 1
AUDIT-C TOTAL SCORE: 0

## 2025-07-28 ASSESSMENT — PAIN - FUNCTIONAL ASSESSMENT
PAIN_FUNCTIONAL_ASSESSMENT: 0-10
PAIN_FUNCTIONAL_ASSESSMENT: 0-10

## 2025-07-28 NOTE — PROGRESS NOTES
"John Mora is a 54 y.o. male on day 1 of admission presenting with Atrial flutter, unspecified type (Multi).    Subjective   Patient needs a mattress  He is wondering if he did not get any more antibiotics after initial dose       Objective     Physical Exam  Vitals reviewed.   Constitutional:       Appearance: Normal appearance.   HENT:      Head: Normocephalic and atraumatic.      Right Ear: Tympanic membrane, ear canal and external ear normal.      Left Ear: Tympanic membrane, ear canal and external ear normal.      Nose: Nose normal.      Mouth/Throat:      Pharynx: Oropharynx is clear.     Eyes:      Extraocular Movements: Extraocular movements intact.      Conjunctiva/sclera: Conjunctivae normal.      Pupils: Pupils are equal, round, and reactive to light.       Cardiovascular:      Rate and Rhythm: Normal rate and regular rhythm.      Pulses: Normal pulses.      Heart sounds: Normal heart sounds.   Pulmonary:      Effort: Pulmonary effort is normal.      Breath sounds: Normal breath sounds.   Abdominal:      General: Abdomen is flat. Bowel sounds are normal.      Palpations: Abdomen is soft.     Musculoskeletal:      Cervical back: Normal range of motion and neck supple.     Skin:     General: Skin is warm and dry.      Findings: Erythema present.      Comments: Right leg erythema not extending to the lower leg     Neurological:      Mental Status: He is alert and oriented to person, place, and time. Mental status is at baseline.      Motor: Weakness present.      Coordination: Coordination abnormal.      Gait: Gait abnormal.      Comments: Quadriplegic   Psychiatric:         Mood and Affect: Mood normal.         Last Recorded Vitals  Blood pressure 110/59, pulse (!) 117, temperature 36.9 °C (98.4 °F), temperature source Temporal, resp. rate 16, height 1.854 m (6' 1\"), weight 104 kg (229 lb 4.5 oz), SpO2 97%.  Intake/Output last 3 Shifts:  No intake/output data recorded.    Relevant Results           "   Results for orders placed or performed during the hospital encounter of 07/27/25 (from the past 24 hours)   CBC and Auto Differential   Result Value Ref Range    WBC 7.3 4.4 - 11.3 x10*3/uL    nRBC 0.0 0.0 - 0.0 /100 WBCs    RBC 4.31 (L) 4.50 - 5.90 x10*6/uL    Hemoglobin 12.4 (L) 13.5 - 17.5 g/dL    Hematocrit 38.2 (L) 41.0 - 52.0 %    MCV 89 80 - 100 fL    MCH 28.8 26.0 - 34.0 pg    MCHC 32.5 32.0 - 36.0 g/dL    RDW 16.9 (H) 11.5 - 14.5 %    Platelets 120 (L) 150 - 450 x10*3/uL    Neutrophils % 88.1 40.0 - 80.0 %    Immature Granulocytes %, Automated 0.7 0.0 - 0.9 %    Lymphocytes % 5.5 13.0 - 44.0 %    Monocytes % 5.3 2.0 - 10.0 %    Eosinophils % 0.1 0.0 - 6.0 %    Basophils % 0.3 0.0 - 2.0 %    Neutrophils Absolute 6.44 1.20 - 7.70 x10*3/uL    Immature Granulocytes Absolute, Automated 0.05 0.00 - 0.70 x10*3/uL    Lymphocytes Absolute 0.40 (L) 1.20 - 4.80 x10*3/uL    Monocytes Absolute 0.39 0.10 - 1.00 x10*3/uL    Eosinophils Absolute 0.01 0.00 - 0.70 x10*3/uL    Basophils Absolute 0.02 0.00 - 0.10 x10*3/uL   Comprehensive metabolic panel   Result Value Ref Range    Glucose 223 (H) 74 - 99 mg/dL    Sodium 130 (L) 136 - 145 mmol/L    Potassium 3.7 3.5 - 5.3 mmol/L    Chloride 97 (L) 98 - 107 mmol/L    Bicarbonate 25 21 - 32 mmol/L    Anion Gap 12 10 - 20 mmol/L    Urea Nitrogen 12 6 - 23 mg/dL    Creatinine 0.61 0.50 - 1.30 mg/dL    eGFR >90 >60 mL/min/1.73m*2    Calcium 8.6 8.6 - 10.3 mg/dL    Albumin 3.5 3.4 - 5.0 g/dL    Alkaline Phosphatase 63 33 - 120 U/L    Total Protein 6.7 6.4 - 8.2 g/dL    AST 52 (H) 9 - 39 U/L    Bilirubin, Total 1.0 0.0 - 1.2 mg/dL    ALT 53 (H) 10 - 52 U/L   Blood Culture    Specimen: Peripheral Venipuncture; Blood culture   Result Value Ref Range    Blood Culture Loaded on Instrument - Culture in progress    Blood Culture    Specimen: Peripheral Venipuncture; Blood culture   Result Value Ref Range    Blood Culture Loaded on Instrument - Culture in progress    Lactate   Result Value  Ref Range    Lactate 2.6 (H) 0.4 - 2.0 mmol/L   Troponin I, High Sensitivity   Result Value Ref Range    Troponin I, High Sensitivity 6 0 - 20 ng/L   ECG 12 lead   Result Value Ref Range    Ventricular Rate 98 BPM    Atrial Rate 227 BPM    QRS Duration 128 ms    QT Interval 362 ms    QTC Calculation(Bazett) 462 ms    P Axis -83 degrees    R Axis 45 degrees    T Axis 73 degrees    QRS Count 16 beats    Q Onset 218 ms    P Onset 112 ms    P Offset 161 ms    T Offset 399 ms    QTC Fredericia 426 ms   Urinalysis with Reflex Culture and Microscopic   Result Value Ref Range    Color, Urine Colorless (N) Light-Yellow, Yellow, Dark-Yellow    Appearance, Urine Clear Clear    Specific Gravity, Urine 1.005 1.005 - 1.035    pH, Urine 6.5 5.0, 5.5, 6.0, 6.5, 7.0, 7.5, 8.0    Protein, Urine NEGATIVE NEGATIVE, 10 (TRACE), 20 (TRACE) mg/dL    Glucose, Urine Normal Normal mg/dL    Blood, Urine NEGATIVE NEGATIVE mg/dL    Ketones, Urine NEGATIVE NEGATIVE mg/dL    Bilirubin, Urine NEGATIVE NEGATIVE mg/dL    Urobilinogen, Urine Normal Normal mg/dL    Nitrite, Urine 1+ (A) NEGATIVE    Leukocyte Esterase, Urine 250 Jordi/uL (A) NEGATIVE   Microscopic Only, Urine   Result Value Ref Range    WBC, Urine 11-20 (A) 1-5, NONE /HPF    RBC, Urine NONE NONE, 1-2, 3-5 /HPF    Bacteria, Urine 1+ (A) NONE SEEN /HPF   Troponin I, High Sensitivity   Result Value Ref Range    Troponin I, High Sensitivity 6 0 - 20 ng/L   Lactate   Result Value Ref Range    Lactate 2.2 (H) 0.4 - 2.0 mmol/L   POCT GLUCOSE   Result Value Ref Range    POCT Glucose 182 (H) 74 - 99 mg/dL   POCT GLUCOSE   Result Value Ref Range    POCT Glucose 207 (H) 74 - 99 mg/dL                    Assessment & Plan  Atrial flutter, unspecified type (Multi)    Urinary tract infection    Cellulitis of right lower extremity    Quadriplegia, C1-C4, complete (Multi)    Muscle spasticity    GERD (gastroesophageal reflux disease)    Bilateral great toes ulcers (Multi)    Neurogenic  bladder    Neurogenic bowel    History of aplastic anemia    Pressure ulcer    Discussed with the nurses.  Orders were not released   Asked nurse to release the orders so patient can get his medications   Will continue Zosyn  Add vancomycin  ID input pending  Atrial flutter related to autonomic dysfunction  Cardiology input  Air mattress ordered  Wound care for toe wound and hip wound  Monitor blood sugars  Sliding scale insulin  See orders for details      I spent  minutes in the professional and overall care of this patient.      Marta Aldridge MD

## 2025-07-28 NOTE — CONSULTS
Wound Care Consult     Visit Date: 7/28/2025      Patient Name: John Mora         MRN: [id number]             Reason for Consult: ***        Wound History: ***     Pertinent Labs:       Assessment:  Wound 07/28/25 Pressure Injury Buttock (Active)   Date First Assessed/Time First Assessed: 07/28/25 0048   Present on Original Admission: Yes  Hand Hygiene Completed: Yes  Primary Wound Type: (c) Pressure Injury  Location: Buttock      Assessments 7/28/2025  1:00 PM   Present on Admission to Healthcare Facility Yes   Site Assessment Fragile;Granulation;Maceration;Pink;Red   Lynn-Wound Assessment Red;Purple;Erythematous;Dry;Clean;Fragile;Friable;Intact   Non-staged Wound Description Partial thickness   Pressure Injury Stage Stage 2   Shape rounded   Wound Length (cm) 1.5 cm   Wound Width (cm) 0.8 cm   Wound Surface Area (cm^2) 0.94 cm^2   Wound Depth (cm) 0.9 cm   Wound Volume (cm^3) 0.565 cm^3   State of Healing Non-healing;Fully granulated   Wound Bed Granulation (%) 80 %   Margins Attached edges;Well-defined edges       No associated kushal Lovett at time of wound care consult of 13:                      Wound Plan: ***Wound care recommendations:    Wash wound and lynn wound area with soap and water and pat dry.    Apply Medihoney to wound bed and cover with a trimmed to size piece of Melgisorb over top as the primary dressing, cover with a foam border dressing. Reapply Medihoney, and Melgisorb every 3-4 days.      Alvarado Chow RN  7/28/2025  1:34 PM

## 2025-07-28 NOTE — PROGRESS NOTES
Physical Therapy                 Therapy Communication Note    Patient Name: John Mora  MRN: [id number]  Department: [address]  Room: 13/13-A  Today's Date: 7/28/2025     Discipline: Physical Therapy    Missed Visit: PT Missed Visit:  (NO)     PT SCREEN    Comment:  PT consult received, chart reviewed. Pt has a history of a spinal cord injury and is dependent at home regarding transfers, bed mobility, and ADLs. Pt has all required DME and 24/7 care at home. PT screen completed, formal evaluation deferred. Pt is without need for skilled physical therapy services. Will discontinue PT order.

## 2025-07-28 NOTE — PROGRESS NOTES
Occupational Therapy                     OT Screen    Patient Name: John Mora  MRN: [id number]  Department: [address]  Room: 13/13-A  Today's Date: 7/28/2025     Discipline: Occupational Therapy    Missed Visit Reason:  OT orders received and pt chart reviewed. 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 He is unable to ambulate or use his upper limbs, and is dependent on family for all ADLs and gregorio transfers (his father is his primary caregiver). Pt is at baseline level of function. No need for skilled OT at this time.

## 2025-07-28 NOTE — CARE PLAN
The patient's goals for the shift include no sign of edema, stable vitals, safety    The clinical goals for the shift include no falls, no pressure injury    Over the shift, the patient did make progress toward the following goals. Barriers to progression include quadriplegia. Recommendations to address these barriers include passive ROM, turning & repositioning.

## 2025-07-28 NOTE — CARE PLAN
Problem: Pain - Adult  Goal: Verbalizes/displays adequate comfort level or baseline comfort level  7/28/2025 0319 by Claudia Cheema RN  Outcome: Progressing  7/28/2025 0316 by Claudia Cheema RN  Outcome: Progressing   The patient's goals for the shift include no sign of edema, stable vitals, safety    The clinical goals for the shift include no falls, no pressure injury    Over the shift, the patient did not make progress toward the following goals. Barriers to progression include bilateral lower extremeties edema. Recommendations to address these barriers include frequent reposition.

## 2025-07-28 NOTE — NURSING NOTE
Pt came to the floor from ED via transport. Pt's father who is the caregiver was at bedside. Air mattress was put in place, pt made comfortable, no sign of distress. See LDA for skin issue, photos taken, vitals are stable, pt is A&O *4, on room air, contreras catheter secured.

## 2025-07-28 NOTE — CARE PLAN
Problem: Pain - Adult  Goal: Verbalizes/displays adequate comfort level or baseline comfort level  Outcome: Progressing   The patient's goals for the shift include no sign of edema, stable vitals, safety    The clinical goals for the shift include no falls, no pressure injury    Over the shift, the patient did not make progress toward the following goals. Barriers to progression include quadriplegia. Recommendations to address these barriers include frequent repositioning, bed alarm, safety, frequent checks on patient.

## 2025-07-28 NOTE — PROGRESS NOTES
Spiritual Care Visit  Spiritual Care Request    Reason for Visit:  Routine Visit: Introduction     Request Received From:       Focus of Care:  Visited With: Patient         Refer to :          Spiritual Care Assessment    Spiritual Assessment:                      Care Provided:       Sense of Community and or Buddhism Affiliation:  U[place]din   Values/Beliefs  Spiritual Requests During Hospitalization: John asked for a blessing but no sacraments from the [facility].     Addressed Needs/Concerns and/or Naresh Through:     Sacramental Encounters  Communion: Does not want communion  Communion Given Indicator: No  Sacrament of Sick-Anointing: Patient declined anointing    Outcome:        Advance Directives:         Spiritual Care Annotation    Annotation:  John asked for a blessing but no sacraments from the [facility].  Jaswinder Hewitt

## 2025-07-28 NOTE — PROGRESS NOTES
07/28/25 1259   Discharge Planning   Living Arrangements Parent   Support Systems Parent;Family members   Assistance Needed ramp at entrance, hospital bed, gregorio lift, wheelchair, waiver program   Type of Residence Private residence   Number of Stairs to Enter Residence 0   Number of Stairs Within Residence 0   Do you have animals or pets at home? No   Who is requesting discharge planning? Provider   Home or Post Acute Services In home services   Type of Home Care Services Other (Comment)  (has waiver program, father is primary caregiver)   Expected Discharge Disposition Home   Does the patient need discharge transport arranged? Yes   [employer] coordination needed? Yes   Financial Resource Strain   How hard is it for you to pay for the very basics like food, housing, medical care, and heating? Not hard   Housing Stability   In the last 12 months, was there a time when you were not able to pay the mortgage or rent on time? N   In the past 12 months, how many times have you moved where you were living? 0   At any time in the past 12 months, were you homeless or living in a shelter (including now)? N   Transportation Needs   In the past 12 months, has lack of transportation kept you from medical appointments or from getting medications? no   In the past 12 months, has lack of transportation kept you from meetings, work, or from getting things needed for daily living? No     TCC spoke to patient at the bedside. Pt lives in a house with his parents. Pt is not on oxygen or dialysis. Pt active with the waiver program. Father is primary caregiver, has a hospital bed, gregorio lift and wheelchair, ramp at entrance.   PCP is Dr. JOSEFA Aldridge. Pharmacy of choice is [facility] in [place]. Pt is not a , does not have a LW or POA. Pt plans to return home when medically clear.    DISCHARGE PLAN TO RETURN HOME NO SKILLED NEEDS.

## 2025-07-28 NOTE — CONSULTS
Inpatient consult to Infectious Diseases  Consult performed by: Alexander Rosales MD  Consult ordered by: Marta Aldridge MD            Primary MD: Marta Aldridge MD    Reason For Consult  Right thigh/leg cellulitis    History Of Present Illness  John Mora is a 54 y.o. male presenting with right thigh swelling and redness.  He has history of quadriplegia.  He was seen with his parents.  Onset was a day prior to presentation, this was preceded by chills.  He came to the hospital for further care.  He has bilateral hallux pressure ulcers which have improved.  He has a right buttock ulcer.  He denies any cough, chest pain or shortness of breath.  He denies any nausea vomiting or diarrhea.  He is on IV vancomycin and Zosyn.  Review of his cultures indicates positive wound culture for Pseudomonas, MRSA, corynebacterium.  His workup was remarkable for for pyuria.  Blood and urine cultures are pending     Past Medical History  He has a past medical history of Other conditions influencing health status, Other conditions influencing health status, Personal history of diseases of the blood and blood-forming organs and certain disorders involving the immune mechanism, Personal history of other endocrine, nutritional and metabolic disease, and Unspecified convulsions (Multi).    Surgical History  He has a past surgical history that includes Other surgical history (04/16/2013).     Social History     Occupational History    Not on file   Tobacco Use    Smoking status: Never    Smokeless tobacco: Never   Substance and Sexual Activity    Alcohol use: Never    Drug use: Never    Sexual activity: Not on file     Travel History   Travel since 06/28/25    No documented travel since 06/28/25         Family History  Family History[1]  Allergies  Lorazepam     Immunization History   Administered Date(s) Administered    Flu vaccine (IIV4), preservative free *Check age/dose* 10/29/2018, 10/20/2021    Flu vaccine, quadrivalent, no egg  "protein, age 6 month or greater (FLUCELVAX) 10/15/2020, 10/25/2022    Flu vaccine, trivalent, preservative free, age 6 months and greater (Fluarix/Fluzone/Flulaval) 10/14/2015, 11/15/2017, 10/25/2024    Influenza Whole 11/20/2007    Influenza, Unspecified 10/19/2004, 11/01/2012    Influenza, injectable, quadrivalent 10/18/2019    Influenza, seasonal, injectable 11/04/2008, 11/05/2013, 09/22/2014, 11/11/2016    Moderna COVID-19 vaccine, bivalent, blue cap/gray label *Check age/dose* 11/10/2022    Moderna SARS-CoV-2 Vaccination 02/12/2021, 03/12/2021, 11/09/2021, 06/15/2022    Novel influenza-H1N1-09, preservative-free 11/22/2009    Pfizer COVID-19 vaccine, 12 years and older, (30mcg/0.3mL) (Comirnaty) 01/17/2024, 10/25/2024    Pneumococcal polysaccharide vaccine, 23-valent, age 2 years and older (PNEUMOVAX 23) 11/12/2004     Medications  Home medications:  Prescriptions Prior to Admission[2]  Current medications:  Scheduled medications  Scheduled Medications[3]  Continuous medications  Continuous Medications[4]  PRN medications  PRN Medications[5]    Review of Systems   Constitutional:  Positive for chills. Negative for fever.   Skin:  Positive for rash and wound.   All other systems reviewed and are negative.       Objective  Range of Vitals (last 24 hours)  Heart Rate:  []   Temp:  [36.4 °C (97.5 °F)-37.2 °C (99 °F)]   Resp:  [14-17]   BP: (106-118)/(55-70)   Height:  [185.4 cm (6' 1\")]   Weight:  [104 kg (228 lb 13.4 oz)-104 kg (229 lb 4.5 oz)]   SpO2:  [95 %-98 %]   Daily Weight  07/28/25 : 104 kg (229 lb 4.5 oz)    Body mass index is 30.25 kg/m².     Physical Exam  Constitutional:       Appearance: Normal appearance.   HENT:      Head: Normocephalic and atraumatic.      Right Ear: External ear normal.      Left Ear: External ear normal.      Nose: Nose normal.     Eyes:      General: No scleral icterus.     Extraocular Movements: Extraocular movements intact.      Conjunctiva/sclera: Conjunctivae normal. " "      Cardiovascular:      Rate and Rhythm: Regular rhythm. Tachycardia present.      Heart sounds: Normal heart sounds.   Pulmonary:      Effort: Pulmonary effort is normal.      Breath sounds: Normal breath sounds.   Abdominal:      Palpations: Abdomen is soft.      Tenderness: There is no abdominal tenderness.     Musculoskeletal:      Cervical back: Normal range of motion and neck supple.      Right upper leg: Swelling and edema present.      Comments: Right thigh erythema with warmth and tenderness     Feet:      Comments: Bilateral distal hallux stage II ulcers    Skin:     General: Skin is warm and dry.      Comments: Right ischial ulcer with large amount of drainage and tissue     Neurological:      Mental Status: He is alert and oriented to person, place, and time.      Comments: Quadriplegia   Psychiatric:         Behavior: Behavior normal. Behavior is cooperative.          Relevant Results  Outside Hospital Results    Labs  Results from last 72 hours   Lab Units 07/27/25  1729   WBC AUTO x10*3/uL 7.3   HEMOGLOBIN g/dL 12.4*   HEMATOCRIT % 38.2*   PLATELETS AUTO x10*3/uL 120*   NEUTROS PCT AUTO % 88.1   LYMPHS PCT AUTO % 5.5   MONOS PCT AUTO % 5.3   EOS PCT AUTO % 0.1     Results from last 72 hours   Lab Units 07/27/25  1729   SODIUM mmol/L 130*   POTASSIUM mmol/L 3.7   CHLORIDE mmol/L 97*   CO2 mmol/L 25   BUN mg/dL 12   CREATININE mg/dL 0.61   GLUCOSE mg/dL 223*   CALCIUM mg/dL 8.6   ANION GAP mmol/L 12   EGFR mL/min/1.73m*2 >90     Results from last 72 hours   Lab Units 07/27/25  1729   ALK PHOS U/L 63   BILIRUBIN TOTAL mg/dL 1.0   PROTEIN TOTAL g/dL 6.7   ALT U/L 53*   AST U/L 52*   ALBUMIN g/dL 3.5     Estimated Creatinine Clearance: 125 mL/min (by C-G formula based on SCr of 0.61 mg/dL).  No results found for: \"CRP\", \"SEDRATE\"  No results found for: \"HIV1X2\", \"HIVCONF\", \"CZJKSY9HO\"  No results found for: \"HEPCABINIT\", \"HEPCAB\", \"HCVPCRQUANT\"  Microbiology  Reviewed-blood and urine cultures " pending  Imaging  ECG 12 lead  Result Date: 7/28/2025  Atrial flutter with variable AV block Nonspecific intraventricular block Abnormal ECG When compared with ECG of 11-APR-1993 16:10, Atrial flutter has replaced Sinus rhythm Vent. rate has increased BY  45 BPM QRS duration has increased Nonspecific T wave abnormality now evident in Inferior leads Nonspecific T wave abnormality now evident in Anterior leads QT has lengthened    Lower extremity venous duplex right  Result Date: 7/27/2025  Interpreted By:  Hieu Ames, STUDY: [facility] US LOWER EXTREMITY VENOUS DUPLEX RIGHT;  7/27/2025 6:52 pm   INDICATION: Signs/Symptoms:Atraumatic pain and swelling.   COMPARISON: None.   ACCESSION NUMBER(S): [id number]   ORDERING CLINICIAN: LESLEY LIVINGSTON   TECHNIQUE: Vascular ultrasound of the right lower extremity was performed. Real-time compression views as well as Gray scale, color Doppler and spectral Doppler waveform analysis was performed.   FINDINGS: Evaluation of the visualized portions of the right common femoral vein, proximal, mid, and distal femoral vein, and popliteal vein were performed.  Evaluation of the visualized portions of the  posterior tibial and peroneal veins were also performed.   Limitations:  Limited evaluation the calf veins.   The evaluated veins demonstrate normal compressibility. There is intact venous flow demonstrating normal respiratory variability and normal augmentation of flow with calf compression. Therefore, there is no ultrasonographic evidence for deep vein thrombosis within the evaluated veins.       No sonographic evidence for deep vein thrombosis within the evaluated veins of the right lower extremity.   MACRO: None   Signed by: Hieu Ames 7/27/2025 7:30 PM Dictation workstation:   [id number]    XR chest 1 view  Result Date: 7/27/2025  Interpreted By:  Reanna Verma, STUDY: Chest, single AP view.   INDICATION: Signs/Symptoms:Malaise, new onset atrial flutter.   COMPARISON: None.    ACCESSION NUMBER(S): [id number]   ORDERING CLINICIAN: LESLEY LIVINGSTON   FINDINGS: The cardiac silhouette size is within normal limits. There is no focal consolidation, edema or pneumothorax. Questionable trace right pleural effusion with blunting of the costophrenic angle. No acute osseous abnormality.       1. Questionable trace right pleural effusion.   MACRO: None.   Signed by: Reanna Verma 7/27/2025 6:42 PM Dictation workstation:   [id number]    XR femur right 2+ views  Result Date: 7/27/2025  Interpreted By:  Reanna Verma, STUDY: Right femur, two views.   INDICATION: Signs/Symptoms:Atraumatic pain and swelling.   COMPARISON: CT pelvis 01/03/2023.   ACCESSION NUMBER(S): [id number]   ORDERING CLINICIAN: LESLEY LIVINGSTON   FINDINGS: No acute fracture or malalignment. Severe right hip osteoarthrosis with joint space loss and osteophytes. Moderate to severe right knee osteoarthrosis as well. Bones appear demineralized. Extensive calcific deposits are visualized in the lateral aspect of the femoral neck and inferior aspect of the ischial tuberosity likely representing hydroxyapatite deposition, also noted on previous CT.       1. Severe right hip and moderate to severe right knee osteoarthrosis. 2. Extensive calcific deposits are visualized in the lateral aspect of the femoral neck and inferior aspect of the ischial tuberosity likely representing hydroxyapatite deposition, also noted on previous CT. 3. No acute fracture or malalignment.   MACRO:   None.   Signed by: Reanna Verma 7/27/2025 6:41 PM Dictation workstation:   [id number]      Assessment/Plan   Right thigh cellulitis  Resolved lactic acidosis  History of MRSA  Pyuria versus urinary tract infection    Continue vancomycin  Continue Zosyn  Follow-up blood cultures  Follow-up urine culture  Wound culture  Local care right thigh ulcer  Supportive care  Monitor temperature and WBC  Further recommendations based on culture results    This is a complex  infectious disease issue and the following was performed today (for more details please see the above note): Management decisions reflecting the added complexity (e.g., changes in antimicrobial therapy, infection control strategies).     Alexander Rosales MD       [1] No family history on file.  [2]   Medications Prior to Admission   Medication Sig Dispense Refill Last Dose/Taking    aspirin 81 mg EC tablet Take 1 tablet (81 mg) by mouth.   7/27/2025    baclofen (Lioresal) 20 mg tablet TAKE 1 TABLET BY MOUTH THREE TIMES DAILY 180 tablet 1 7/27/2025    citalopram (CeleXA) 20 mg tablet Take 1 tablet (20 mg) by mouth once daily. 90 tablet 1 7/26/2025    omeprazole (PriLOSEC) 20 mg DR capsule Take 1 capsule (20 mg) by mouth every 12 hours.   7/26/2025    bisacodyl (Dulcolax) 10 mg suppository Insert 1 suppository (10 mg) into the rectum.   Unknown    ciprofloxacin (Cipro) 500 mg tablet Take 1 Tablet by mouth as needed (Take twice a day x 3 days following catheter changes).   Unknown    clobetasol (Olux) 0.05 % topical foam Apply topically 2 times a day. 100 g 1 Unknown    doxycycline (Vibramycin) 100 mg capsule Take 1 capsule (100 mg) by mouth 2 times a day. Take with at least 8 ounces (large glass) of water, do not lie down for 30 minutes after 20 capsule 0 Unknown    ketoconazole (NIZOral) 2 % shampoo Apply topically 2 times a week. Shampoo daily, leave on for 5-10 minutes, then rinse. 3600 mL 0 Unknown    omeprazole OTC (PriLOSEC OTC) 20 mg EC tablet Take 1 tablet (20 mg) by mouth 2 times a day before meals. Do not crush, chew, or split. 180 tablet 1     tolterodine (Detrol) 1 mg tablet TAKE 1 TABLET BY MOUTH ONCE DAILY 30 tablet 2 Unknown    triamcinolone (Kenalog) 0.5 % cream Apply topically 3 times a day. Apply to affected areas 2-3 times daily 454 g 1 Unknown    wound dressings paste Apply 1 Application topically every other day.   Unknown   [3] aspirin, 81 mg, oral, Daily  baclofen, 20 mg, oral,  TID  citalopram, 20 mg, oral, Nightly  docusate sodium, 100 mg, oral, BID  enoxaparin, 40 mg, subcutaneous, q24h GHADA  insulin lispro, 0-5 Units, subcutaneous, TID AC  oxyBUTYnin, 5 mg, oral, BID  pantoprazole, 40 mg, oral, Daily before breakfast   Or  pantoprazole, 40 mg, intravenous, Daily before breakfast  perflutren lipid microspheres, 0.5-10 mL of dilution, intravenous, Once in imaging  perflutren protein A microsphere, 0.5 mL, intravenous, Once in imaging  piperacillin-tazobactam, 4.5 g, intravenous, q6h  polyethylene glycol, 17 g, oral, Daily  sulfur hexafluoride microsphr, 2 mL, intravenous, Once in imaging  vancomycin, 1,250 mg, intravenous, q12h    [4] sodium chloride 0.9%, 100 mL/hr, Last Rate: 100 mL/hr (07/28/25 1526)    [5] PRN medications: acetaminophen **OR** acetaminophen **OR** acetaminophen, acetaminophen **OR** acetaminophen **OR** acetaminophen, benzocaine-menthol, bisacodyl, dextromethorphan-guaifenesin, dextrose, dextrose, glucagon, glucagon, guaiFENesin, melatonin, ondansetron **OR** ondansetron, vancomycin

## 2025-07-28 NOTE — ED PROVIDER NOTES
HPI   Chief Complaint   Patient presents with    Wound Check       HPI  Patient is a 54-year-old male who is a quadriplegic who presents ED for chief complaint of possible cellulitis of the right thigh.  Patient unsure if his symptoms started today or just noticed it today.  His father who is his caretaker endorses redness and firmness of the right thigh that he noticed while he was giving him a bath.  Patient is denying any other acute complaints at this time.  Denying any chest pain or shortness of breath.  Denying abdominal pain or NVD.  Denying any urinary symptoms, he has a chronic Kwan.  No recent hospitalizations or surgeries.  No recent travel or sick contacts.      Patient History   Medical History[1]  Surgical History[2]  Family History[3]  Social History[4]    Physical Exam   ED Triage Vitals [07/27/25 1610]   Temp Heart Rate Resp BP   36.5 °C (97.7 °F) 90 18 115/68      SpO2 Temp Source Heart Rate Source Patient Position   97 % Tympanic Monitor Sitting      BP Location FiO2 (%)     Right arm --       Physical Exam  Vitals reviewed.   Constitutional:       Appearance: He is not ill-appearing.   HENT:      Head: Atraumatic.     Eyes:      Extraocular Movements: Extraocular movements intact.       Cardiovascular:      Rate and Rhythm: Normal rate and regular rhythm.      Heart sounds: Normal heart sounds.   Pulmonary:      Effort: Pulmonary effort is normal.      Breath sounds: Normal breath sounds.   Abdominal:      General: Abdomen is flat. There is no distension.      Palpations: Abdomen is soft.      Tenderness: There is no abdominal tenderness.     Musculoskeletal:         General: No signs of injury.      Cervical back: Normal range of motion.     Skin:     Findings: Erythema present.     Neurological:      Mental Status: He is alert and oriented to person, place, and time.     Psychiatric:         Behavior: Behavior normal.           ED Course & MDM   Diagnoses as of 07/27/25 8356   Atrial flutter,  unspecified type (Multi)   Cellulitis of right lower extremity                 No data recorded     [place] Coma Scale Score: 15 (07/27/25 1614 : Brianna Stewart RN)                           Medical Decision Making  Parts of this chart have been completed using voice recognition software. Please excuse any errors of transcription.  My thought process and reason for plan has been formulated from the time that I saw the patient until the time of disposition and is not specific to one specific moment during their visit and furthermore my MDM encompasses this entire chart and not only this text box.    HPI:   A medically appropriate HPI was obtained, outlined above.    John Mora is a  54 y.o. male    Chief Complaint   Patient presents with    Wound Check       Medical History[5]    Surgical History[6]    Social History[7]    Family History[8]    Allergies[9]    Current Outpatient Medications   Medication Instructions    aspirin 81 mg    baclofen (LIORESAL) 20 mg, oral, 3 times daily    bisacodyl (DULCOLAX) 10 mg    ciprofloxacin (Cipro) 500 mg tablet Take 1 Tablet by mouth as needed (Take twice a day x 3 days following catheter changes).    citalopram (CELEXA) 20 mg, oral, Daily    clobetasol (Olux) 0.05 % topical foam Topical, 2 times daily    doxycycline (VIBRAMYCIN) 100 mg, oral, 2 times daily, Take with at least 8 ounces (large glass) of water, do not lie down for 30 minutes after    ketoconazole (NIZOral) 2 % shampoo Topical, 2 times weekly, Shampoo daily, leave on for 5-10 minutes, then rinse.    omeprazole (PriLOSEC) 20 mg DR capsule 1 capsule, Every 12 hours scheduled (0630,1830)    omeprazole OTC (PRILOSEC OTC) 20 mg, oral, 2 times daily before meals, Do not crush, chew, or split.    tolterodine (DETROL) 1 mg, oral, Daily    triamcinolone (Kenalog) 0.5 % cream Topical, 3 times daily, Apply to affected areas 2-3 times daily    wound dressings paste 1 Application, Every other day   for details    Exam:  "  Patient Vitals for the past 24 hrs:   BP Temp Temp src Pulse Resp SpO2 Height Weight   07/27/25 2336 118/70 36.6 °C (97.9 °F) Temporal 97 17 95 % -- 104 kg (228 lb 13.4 oz)   07/27/25 2245 -- -- -- 96 14 -- -- --   07/27/25 1630 113/72 -- -- (!) 103 18 96 % -- --   07/27/25 1610 115/68 36.5 °C (97.7 °F) Tympanic 90 18 97 % 1.854 m (6' 1\") 109 kg (240 lb)       A medically appropriate exam performed, outlined above, given the known history and presentation.    EKG/Cardiac monitor:   If EKG was done and, it was interpreted by attending physician, see their note for ED course for more detail.    Medications given during visit:  Medications   baclofen (Lioresal) tablet 20 mg (20 mg oral Given 7/27/25 2239)   citalopram (CeleXA) tablet 20 mg (has no administration in time range)   oxyBUTYnin (Ditropan) tablet 5 mg (5 mg oral Given 7/27/25 2239)   sodium chloride 0.9 % bolus 1,000 mL (0 mL intravenous Stopped 7/27/25 2147)   piperacillin-tazobactam (Zosyn) 3.375 g in dextrose (iso) IV 50 mL (0 g intravenous Stopped 7/27/25 2018)   aspirin chewable tablet 81 mg (81 mg oral Given 7/27/25 2239)        Diagnostic/tests:  Labs Reviewed   CBC WITH AUTO DIFFERENTIAL - Abnormal       Result Value    WBC 7.3      nRBC 0.0      RBC 4.31 (*)     Hemoglobin 12.4 (*)     Hematocrit 38.2 (*)     MCV 89      MCH 28.8      MCHC 32.5      RDW 16.9 (*)     Platelets 120 (*)     Neutrophils % 88.1      Immature Granulocytes %, Automated 0.7      Lymphocytes % 5.5      Monocytes % 5.3      Eosinophils % 0.1      Basophils % 0.3      Neutrophils Absolute 6.44      Immature Granulocytes Absolute, Automated 0.05      Lymphocytes Absolute 0.40 (*)     Monocytes Absolute 0.39      Eosinophils Absolute 0.01      Basophils Absolute 0.02     COMPREHENSIVE METABOLIC PANEL - Abnormal    Glucose 223 (*)     Sodium 130 (*)     Potassium 3.7      Chloride 97 (*)     Bicarbonate 25      Anion Gap 12      Urea Nitrogen 12      Creatinine 0.61      eGFR " >90      Calcium 8.6      Albumin 3.5      Alkaline Phosphatase 63      Total Protein 6.7      AST 52 (*)     Bilirubin, Total 1.0      ALT 53 (*)    URINALYSIS WITH REFLEX CULTURE AND MICROSCOPIC - Abnormal    Color, Urine Colorless (*)     Appearance, Urine Clear      Specific Gravity, Urine 1.005      pH, Urine 6.5      Protein, Urine NEGATIVE      Glucose, Urine Normal      Blood, Urine NEGATIVE      Ketones, Urine NEGATIVE      Bilirubin, Urine NEGATIVE      Urobilinogen, Urine Normal      Nitrite, Urine 1+ (*)     Leukocyte Esterase, Urine 250 Jordi/uL (*)    LACTATE - Abnormal    Lactate 2.6 (*)     Narrative:     Venipuncture immediately after or during the administration of Metamizole may lead to falsely low results. Testing should be performed immediately prior to Metamizole dosing.   MICROSCOPIC ONLY, URINE - Abnormal    WBC, Urine 11-20 (*)     RBC, Urine NONE      Bacteria, Urine 1+ (*)    LACTATE - Abnormal    Lactate 2.2 (*)     Narrative:     Venipuncture immediately after or during the administration of Metamizole may lead to falsely low results. Testing should be performed immediately prior to Metamizole dosing.   TROPONIN I, HIGH SENSITIVITY - Normal    Troponin I, High Sensitivity 6      Narrative:     Less than 99th percentile of normal range cutoff-  Female and children under 18 years old <14 ng/L; Male <21 ng/L: Negative  Repeat testing should be performed if clinically indicated.     Female and children under 18 years old 14-50 ng/L; Male 21-50 ng/L:  Consistent with possible cardiac damage and possible increased clinical   risk. Serial measurements may help to assess extent of myocardial damage.     >50 ng/L: Consistent with cardiac damage, increased clinical risk and  myocardial infarction. Serial measurements may help assess extent of   myocardial damage.      NOTE: Children less than 1 year old may have higher baseline troponin   levels and results should be interpreted in conjunction with  the overall   clinical context.     NOTE: Troponin I testing is performed using a different   testing methodology at [facility] than at other   [facility]. Direct result comparisons should only   be made within the same method.   TROPONIN I, HIGH SENSITIVITY - Normal    Troponin I, High Sensitivity 6      Narrative:     Less than 99th percentile of normal range cutoff-  Female and children under 18 years old <14 ng/L; Male <21 ng/L: Negative  Repeat testing should be performed if clinically indicated.     Female and children under 18 years old 14-50 ng/L; Male 21-50 ng/L:  Consistent with possible cardiac damage and possible increased clinical   risk. Serial measurements may help to assess extent of myocardial damage.     >50 ng/L: Consistent with cardiac damage, increased clinical risk and  myocardial infarction. Serial measurements may help assess extent of   myocardial damage.      NOTE: Children less than 1 year old may have higher baseline troponin   levels and results should be interpreted in conjunction with the overall   clinical context.     NOTE: Troponin I testing is performed using a different   testing methodology at [facility] than at other   [facility]. Direct result comparisons should only   be made within the same method.   BLOOD CULTURE   BLOOD CULTURE   URINE CULTURE   URINALYSIS WITH REFLEX CULTURE AND MICROSCOPIC    Narrative:     The following orders were created for panel order Urinalysis with Reflex Culture and Microscopic.  Procedure                               Abnormality         Status                     ---------                               -----------         ------                     Urinalysis with Reflex C...[[id number]]  Abnormal            Final result               Extra Urine Gray Tube[[id number]]                            In process                   Please view results for these tests on the individual orders.   EXTRA URINE GRAY TUBE         Lower extremity venous duplex right   Final Result   No sonographic evidence for deep vein thrombosis within the evaluated   veins of the right lower extremity.        MACRO:   None        Signed by: Hieu Ames 7/27/2025 7:30 PM   Dictation workstation:   [id number]      XR femur right 2+ views   Final Result   1. Severe right hip and moderate to severe right knee osteoarthrosis.   2. Extensive calcific deposits are visualized in the lateral aspect   of the femoral neck and inferior aspect of the ischial tuberosity   likely representing hydroxyapatite deposition, also noted on previous   CT.   3. No acute fracture or malalignment.        MACRO:        None.        Signed by: Reanna Verma 7/27/2025 6:41 PM   Dictation workstation:   [id number]      XR chest 1 view   Final Result   1. Questionable trace right pleural effusion.        MACRO:   None.        Signed by: Reanna Verma 7/27/2025 6:42 PM   Dictation workstation:   [id number]             [facility] Summary:  Patient noted to be in a flutter on EKG, he has no leukocytosis or fever.  Blood cultures obtained.  No significant lab abnormality otherwise.  Imaging today without acute findings.  Patient admitted to medicine for further management.      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    Personal history of diseases of the blood and blood-forming organs and certain disorders involving the immune mechanism     History of aplastic anemia    Personal history of other endocrine, nutritional and metabolic disease     History of dehydration    Unspecified convulsions (Multi)     Seizure   [6]   Past Surgical History:  Procedure Laterality Date    OTHER SURGICAL HISTORY  04/16/2013    Duodenum Ulcer Suture   [7]   Social History  Tobacco Use    Smoking status: Never    Smokeless tobacco: Never   Substance Use Topics    Alcohol use: Never    Drug use: Never   [8] No family history on file.  [9]   Allergies  Allergen Reactions    Lorazepam Other        Alec Schilling PA-C  07/27/25 6013

## 2025-07-28 NOTE — CONSULTS
Inpatient consult to Cardiology  Consult performed by: MANUEL Harrison-CNP  Consult ordered by: Marta Aldridge MD  Reason for consult: Flutter          Reason For Consult  Flutter    History Of Present Illness  John Mora is a 54 y.o. male with a history of cervical spinal cord injury with quadriplegia, GERD, anxiety, remote atrial flutter, presenting with possible cellulitis of his right thigh. His father noted a reddened area on is thigh. The patient reports he feels fine, though he reports he has no feeling from his neck down. EKG in the ED showed rate controlled atrial flutter with variable AV block. Initial lab work reveals sodium 130, potassium 3.7, creatinine 0.61, ALT 53, AST 52, lactate 2.6, serial troponins 6,6, hemoglobin 12.4, platelets 120. Blood and urine cultures are pending. CXR with trace R pleural effusion. Blood pressures are normotensive. He was started on IVFs, IV Zosyn, and subcutaneous Lovenox. TTE has been ordered by the primary team.     Past Medical History  He has a past medical history of Other conditions influencing health status, Other conditions influencing health status, Personal history of diseases of the blood and blood-forming organs and certain disorders involving the immune mechanism, Personal history of other endocrine, nutritional and metabolic disease, and Unspecified convulsions (Multi).    Surgical History  He has a past surgical history that includes Other surgical history (04/16/2013).     Social History  He reports that he has never smoked. He has never used smokeless tobacco. He reports that he does not drink alcohol and does not use drugs.    Family History  Family History[1]     Allergies  Lorazepam    Review of Systems   Constitutional:  Positive for fever.   HENT: Negative.     Eyes: Negative.    Respiratory: Negative.     Cardiovascular: Negative.    Gastrointestinal: Negative.    Endocrine: Negative.    Genitourinary: Negative.    Musculoskeletal: Negative.   "  Skin:         R thigh erythema   Allergic/Immunologic: Negative.    Neurological: Negative.    Hematological: Negative.    Psychiatric/Behavioral: Negative.          Physical Exam  Vitals reviewed.   Constitutional:       General: He is not in acute distress.     Appearance: Normal appearance. He is obese. He is not ill-appearing, toxic-appearing or diaphoretic.   HENT:      Head: Normocephalic and atraumatic.      Right Ear: External ear normal.      Left Ear: External ear normal.      Nose: Nose normal.      Mouth/Throat:      Mouth: Mucous membranes are moist.     Eyes:      Extraocular Movements: Extraocular movements intact.       Cardiovascular:      Rate and Rhythm: Normal rate. Rhythm irregular.      Pulses: Normal pulses.      Heart sounds: Normal heart sounds.   Pulmonary:      Effort: Pulmonary effort is normal.      Breath sounds: Normal breath sounds.   Abdominal:      General: There is no distension.      Palpations: Abdomen is soft.      Tenderness: There is no abdominal tenderness.     Musculoskeletal:         General: Normal range of motion.      Cervical back: Normal range of motion.      Right lower leg: No edema.      Left lower leg: No edema.     Skin:     General: Skin is warm and dry.      Capillary Refill: Capillary refill takes less than 2 seconds.     Neurological:      Mental Status: He is alert and oriented to person, place, and time. Mental status is at baseline.      Sensory: Sensory deficit present.     Psychiatric:         Mood and Affect: Mood normal.         Behavior: Behavior normal.          Last Recorded Vitals  Blood pressure 106/67, pulse (!) 117, temperature 37.2 °C (99 °F), temperature source Temporal, resp. rate 16, height 1.854 m (6' 1\"), weight 104 kg (229 lb 4.5 oz), SpO2 97%.    Relevant Results  Results for orders placed or performed during the hospital encounter of 07/27/25 (from the past 24 hours)   CBC and Auto Differential   Result Value Ref Range    WBC 7.3 4.4 - " 11.3 x10*3/uL    nRBC 0.0 0.0 - 0.0 /100 WBCs    RBC 4.31 (L) 4.50 - 5.90 x10*6/uL    Hemoglobin 12.4 (L) 13.5 - 17.5 g/dL    Hematocrit 38.2 (L) 41.0 - 52.0 %    MCV 89 80 - 100 fL    MCH 28.8 26.0 - 34.0 pg    MCHC 32.5 32.0 - 36.0 g/dL    RDW 16.9 (H) 11.5 - 14.5 %    Platelets 120 (L) 150 - 450 x10*3/uL    Neutrophils % 88.1 40.0 - 80.0 %    Immature Granulocytes %, Automated 0.7 0.0 - 0.9 %    Lymphocytes % 5.5 13.0 - 44.0 %    Monocytes % 5.3 2.0 - 10.0 %    Eosinophils % 0.1 0.0 - 6.0 %    Basophils % 0.3 0.0 - 2.0 %    Neutrophils Absolute 6.44 1.20 - 7.70 x10*3/uL    Immature Granulocytes Absolute, Automated 0.05 0.00 - 0.70 x10*3/uL    Lymphocytes Absolute 0.40 (L) 1.20 - 4.80 x10*3/uL    Monocytes Absolute 0.39 0.10 - 1.00 x10*3/uL    Eosinophils Absolute 0.01 0.00 - 0.70 x10*3/uL    Basophils Absolute 0.02 0.00 - 0.10 x10*3/uL   Comprehensive metabolic panel   Result Value Ref Range    Glucose 223 (H) 74 - 99 mg/dL    Sodium 130 (L) 136 - 145 mmol/L    Potassium 3.7 3.5 - 5.3 mmol/L    Chloride 97 (L) 98 - 107 mmol/L    Bicarbonate 25 21 - 32 mmol/L    Anion Gap 12 10 - 20 mmol/L    Urea Nitrogen 12 6 - 23 mg/dL    Creatinine 0.61 0.50 - 1.30 mg/dL    eGFR >90 >60 mL/min/1.73m*2    Calcium 8.6 8.6 - 10.3 mg/dL    Albumin 3.5 3.4 - 5.0 g/dL    Alkaline Phosphatase 63 33 - 120 U/L    Total Protein 6.7 6.4 - 8.2 g/dL    AST 52 (H) 9 - 39 U/L    Bilirubin, Total 1.0 0.0 - 1.2 mg/dL    ALT 53 (H) 10 - 52 U/L   Blood Culture    Specimen: Peripheral Venipuncture; Blood culture   Result Value Ref Range    Blood Culture Loaded on Instrument - Culture in progress    Blood Culture    Specimen: Peripheral Venipuncture; Blood culture   Result Value Ref Range    Blood Culture Loaded on Instrument - Culture in progress    Lactate   Result Value Ref Range    Lactate 2.6 (H) 0.4 - 2.0 mmol/L   Troponin I, High Sensitivity   Result Value Ref Range    Troponin I, High Sensitivity 6 0 - 20 ng/L   ECG 12 lead   Result Value  Ref Range    Ventricular Rate 98 BPM    Atrial Rate 227 BPM    QRS Duration 128 ms    QT Interval 362 ms    QTC Calculation(Bazett) 462 ms    P Axis -83 degrees    R Axis 45 degrees    T Axis 73 degrees    QRS Count 16 beats    Q Onset 218 ms    P Onset 112 ms    P Offset 161 ms    T Offset 399 ms    QTC Fredericia 426 ms   Urinalysis with Reflex Culture and Microscopic   Result Value Ref Range    Color, Urine Colorless (N) Light-Yellow, Yellow, Dark-Yellow    Appearance, Urine Clear Clear    Specific Gravity, Urine 1.005 1.005 - 1.035    pH, Urine 6.5 5.0, 5.5, 6.0, 6.5, 7.0, 7.5, 8.0    Protein, Urine NEGATIVE NEGATIVE, 10 (TRACE), 20 (TRACE) mg/dL    Glucose, Urine Normal Normal mg/dL    Blood, Urine NEGATIVE NEGATIVE mg/dL    Ketones, Urine NEGATIVE NEGATIVE mg/dL    Bilirubin, Urine NEGATIVE NEGATIVE mg/dL    Urobilinogen, Urine Normal Normal mg/dL    Nitrite, Urine 1+ (A) NEGATIVE    Leukocyte Esterase, Urine 250 Jordi/uL (A) NEGATIVE   Microscopic Only, Urine   Result Value Ref Range    WBC, Urine 11-20 (A) 1-5, NONE /HPF    RBC, Urine NONE NONE, 1-2, 3-5 /HPF    Bacteria, Urine 1+ (A) NONE SEEN /HPF   Troponin I, High Sensitivity   Result Value Ref Range    Troponin I, High Sensitivity 6 0 - 20 ng/L   Lactate   Result Value Ref Range    Lactate 2.2 (H) 0.4 - 2.0 mmol/L    ECG 12 lead  Result Date: 7/28/2025  Atrial flutter with variable AV block Nonspecific intraventricular block Abnormal ECG When compared with ECG of 11-APR-1993 16:10, Atrial flutter has replaced Sinus rhythm Vent. rate has increased BY  45 BPM QRS duration has increased Nonspecific T wave abnormality now evident in Inferior leads Nonspecific T wave abnormality now evident in Anterior leads QT has lengthened    Lower extremity venous duplex right  Result Date: 7/27/2025  Interpreted By:  Hieu Ames, STUDY: [facility] [place]EN[place]UP[place] RIGHT;  7/27/2025 6:52 pm   INDICATION: Signs/Symptoms:Atraumatic pain and swelling.    COMPARISON: None.   ACCESSION NUMBER(S): [id number]   ORDERING CLINICIAN: LESLEY LIVINGSTON   TECHNIQUE: Vascular ultrasound of the right lower extremity was performed. Real-time compression views as well as Gray scale, color Doppler and spectral Doppler waveform analysis was performed.   FINDINGS: Evaluation of the visualized portions of the right common femoral vein, proximal, mid, and distal femoral vein, and popliteal vein were performed.  Evaluation of the visualized portions of the  posterior tibial and peroneal veins were also performed.   Limitations:  Limited evaluation the calf veins.   The evaluated veins demonstrate normal compressibility. There is intact venous flow demonstrating normal respiratory variability and normal augmentation of flow with calf compression. Therefore, there is no ultrasonographic evidence for deep vein thrombosis within the evaluated veins.       No sonographic evidence for deep vein thrombosis within the evaluated veins of the right lower extremity.   MACRO: None   Signed by: Hieu Ames 7/27/2025 7:30 PM Dictation workstation:   [id number]    XR chest 1 view  Result Date: 7/27/2025  Interpreted By:  Reanna Verma, STUDY: Chest, single AP view.   INDICATION: Signs/Symptoms:Malaise, new onset atrial flutter.   COMPARISON: None.   ACCESSION NUMBER(S): [id number]   ORDERING CLINICIAN: LESLEY LIVINGSTON   FINDINGS: The cardiac silhouette size is within normal limits. There is no focal consolidation, edema or pneumothorax. Questionable trace right pleural effusion with blunting of the costophrenic angle. No acute osseous abnormality.       1. Questionable trace right pleural effusion.   MACRO: None.   Signed by: Reanna Verma 7/27/2025 6:42 PM Dictation workstation:   [id number]    XR femur right 2+ views  Result Date: 7/27/2025  Interpreted By:  Reanna Verma, STUDY: Right femur, two views.   INDICATION: Signs/Symptoms:Atraumatic pain and swelling.   COMPARISON: CT pelvis  01/03/2023.   ACCESSION NUMBER(S): [id number]   ORDERING CLINICIAN: LESLEY LIVINGSTON   FINDINGS: No acute fracture or malalignment. Severe right hip osteoarthrosis with joint space loss and osteophytes. Moderate to severe right knee osteoarthrosis as well. Bones appear demineralized. Extensive calcific deposits are visualized in the lateral aspect of the femoral neck and inferior aspect of the ischial tuberosity likely representing hydroxyapatite deposition, also noted on previous CT.       1. Severe right hip and moderate to severe right knee osteoarthrosis. 2. Extensive calcific deposits are visualized in the lateral aspect of the femoral neck and inferior aspect of the ischial tuberosity likely representing hydroxyapatite deposition, also noted on previous CT. 3. No acute fracture or malalignment.   MACRO:   None.   Signed by: Reanna Verma 7/27/2025 6:41 PM Dictation workstation:   [id number]        Assessment/Plan     Atrial flutter  R thigh cellulitis  Possible UTI  Cervical spinal cord injury with quadriplegia  GERD  Anxiety    7/28: As above. 53 yo M with a history of cervical spinal cord injury with quadriplegia, GERD, anxiety, and remote atrial flutter that was admitted with R thigh cellulitis and UTI. The patient denies any chest discomfort, palpitations, or SOB, though he admits that he has no feeling from his neck down. He has been started on IVFs and IV ATBs. Urine and blood cultures pending. On admission his EKG showed rate controlled atrial flutter. He has been started on subcutaneous Lovenox and a TTE has been ordered by the primary team. CHADS-VASc score is 0. Consult EP for assessment for ablation and further management. We will await results of TTE.  Continue to monitor telemetry. We will follow with you.       I spent 60 minutes in the professional and overall care of this patient.                 [1] No family history on file.

## 2025-07-28 NOTE — CONSULTS
Vancomycin Dosing by Pharmacy- INITIAL    John Mora is a 54 y.o. year old male who Pharmacy has been consulted for vancomycin dosing for cellulitis, skin and soft tissue. Based on the patient's indication and renal status this patient will be dosed based on a goal AUC of 400-600.     Renal function is currently stable.    Visit Vitals  /59 (BP Location: Left arm, Patient Position: Lying)   Pulse (!) 117   Temp 36.9 °C (98.4 °F) (Temporal)   Resp 16        Lab Results   Component Value Date    CREATININE 0.61 2025    CREATININE 0.5 2023    CREATININE 0.6 2023    CREATININE 0.6 2022    CREATININE 0.7 2018        Patient weight is as follows:   Vitals:    25 1039   Weight: 104 kg (229 lb 4.5 oz)       Cultures:  No results found for the encounter in last 14 days.        No intake/output data recorded.  I/O during current shift:  I/O this shift:  In: 456.7 [I.V.:456.7]  Out: 2900 [Urine:2900]    Temp (24hrs), Av.8 °C (98.3 °F), Min:36.4 °C (97.5 °F), Max:37.2 °C (99 °F)         Assessment/Plan     Patient will not be given a loading dose.  Will initiate vancomycin maintenance, 1250 mg every 12 hours beginning 25 at 2200.    This dosing regimen is predicted by [employer] to result in the following pharmacokinetic parameters:    Loading dose: N/A  Regimen: 1250 mg IV every 12 hours.  Start time: 17:31 on 2025  Exposure target: AUC24 (range) 400-600 mg/L.hr   HNP41-69: 380 mg/L.hr  AUC24,ss: 455 mg/L.hr  Probability of AUC24 > 400: 64 %  Ctrough,ss: 14 mg/L  Probability of Ctrough,ss > 20: 22 %    Follow-up level will be ordered on 25 at AM lab draw unless clinically indicated sooner.  Will continue to monitor renal function daily while on vancomycin and order serum creatinine at least every 48 hours if not already ordered.  Follow for continued vancomycin needs, clinical response, and signs/symptoms of toxicity.       Jesús Hillman, PharmD

## 2025-07-28 NOTE — NURSING NOTE
Four Eye Skin Check completed by [facility] and [facility].     Acute findings noted and the following was completed:   ______wound photos taken an applied__ to LDA,  wound was mesasured and assessed, Physican notified, assessed for correct mattress (indicate if new mattress added), wound consulted, initiated Nutrition consult if applicable. .

## 2025-07-29 ENCOUNTER — APPOINTMENT (OUTPATIENT)
Dept: CARDIOLOGY | Facility: HOSPITAL | Age: 55
End: 2025-07-29
Payer: COMMERCIAL

## 2025-07-29 LAB
ALBUMIN SERPL BCP-MCNC: 3.2 G/DL (ref 3.4–5)
ALP SERPL-CCNC: 58 U/L (ref 33–120)
ALT SERPL W P-5'-P-CCNC: 35 U/L (ref 10–52)
ANION GAP SERPL CALCULATED.3IONS-SCNC: 10 MMOL/L (ref 10–20)
AORTIC VALVE MEAN GRADIENT: 3 MMHG
AORTIC VALVE PEAK VELOCITY: 1.15 M/S
AST SERPL W P-5'-P-CCNC: 27 U/L (ref 9–39)
ATRIAL RATE: 227 BPM
AV PEAK GRADIENT: 5 MMHG
AVA (PEAK VEL): 2.6 CM2
AVA (VTI): 3.18 CM2
BILIRUB SERPL-MCNC: 0.7 MG/DL (ref 0–1.2)
BUN SERPL-MCNC: 7 MG/DL (ref 6–23)
CALCIUM SERPL-MCNC: 8.1 MG/DL (ref 8.6–10.3)
CHLORIDE SERPL-SCNC: 104 MMOL/L (ref 98–107)
CO2 SERPL-SCNC: 25 MMOL/L (ref 21–32)
CREAT SERPL-MCNC: 0.59 MG/DL (ref 0.5–1.3)
EGFRCR SERPLBLD CKD-EPI 2021: >90 ML/MIN/1.73M*2
EJECTION FRACTION APICAL 4 CHAMBER: 51.1
EJECTION FRACTION: 58 %
ERYTHROCYTE [DISTWIDTH] IN BLOOD BY AUTOMATED COUNT: 17.4 % (ref 11.5–14.5)
GLUCOSE BLD MANUAL STRIP-MCNC: 145 MG/DL (ref 74–99)
GLUCOSE BLD MANUAL STRIP-MCNC: 152 MG/DL (ref 74–99)
GLUCOSE BLD MANUAL STRIP-MCNC: 154 MG/DL (ref 74–99)
GLUCOSE BLD MANUAL STRIP-MCNC: 182 MG/DL (ref 74–99)
GLUCOSE SERPL-MCNC: 145 MG/DL (ref 74–99)
HCT VFR BLD AUTO: 35.5 % (ref 41–52)
HGB BLD-MCNC: 11.9 G/DL (ref 13.5–17.5)
LEFT VENTRICLE INTERNAL DIMENSION DIASTOLE: 4.35 CM (ref 3.5–6)
LEFT VENTRICULAR OUTFLOW TRACT DIAMETER: 2.05 CM
LV EJECTION FRACTION BIPLANE: 57 %
MCH RBC QN AUTO: 28.9 PG (ref 26–34)
MCHC RBC AUTO-ENTMCNC: 33.5 G/DL (ref 32–36)
MCV RBC AUTO: 86 FL (ref 80–100)
MITRAL VALVE E/A RATIO: 2
NRBC BLD-RTO: 0 /100 WBCS (ref 0–0)
P AXIS: -83 DEGREES
P OFFSET: 161 MS
P ONSET: 112 MS
PLATELET # BLD AUTO: 110 X10*3/UL (ref 150–450)
POTASSIUM SERPL-SCNC: 3.9 MMOL/L (ref 3.5–5.3)
PROT SERPL-MCNC: 6.2 G/DL (ref 6.4–8.2)
Q ONSET: 218 MS
QRS COUNT: 16 BEATS
QRS DURATION: 128 MS
QT INTERVAL: 362 MS
QTC CALCULATION(BAZETT): 462 MS
QTC FREDERICIA: 426 MS
R AXIS: 45 DEGREES
RBC # BLD AUTO: 4.12 X10*6/UL (ref 4.5–5.9)
SODIUM SERPL-SCNC: 135 MMOL/L (ref 136–145)
T AXIS: 73 DEGREES
T OFFSET: 399 MS
VANCOMYCIN SERPL-MCNC: 7.3 UG/ML (ref 5–20)
VENTRICULAR RATE: 98 BPM
WBC # BLD AUTO: 5.9 X10*3/UL (ref 4.4–11.3)

## 2025-07-29 PROCEDURE — 2060000001 HC INTERMEDIATE ICU ROOM DAILY

## 2025-07-29 PROCEDURE — 2500000004 HC RX 250 GENERAL PHARMACY W/ HCPCS (ALT 636 FOR OP/ED): Mod: JW | Performed by: INTERNAL MEDICINE

## 2025-07-29 PROCEDURE — 2500000004 HC RX 250 GENERAL PHARMACY W/ HCPCS (ALT 636 FOR OP/ED): Performed by: INTERNAL MEDICINE

## 2025-07-29 PROCEDURE — 2500000004 HC RX 250 GENERAL PHARMACY W/ HCPCS (ALT 636 FOR OP/ED): Performed by: PHARMACY

## 2025-07-29 PROCEDURE — C8929 TTE W OR WO FOL WCON,DOPPLER: HCPCS

## 2025-07-29 PROCEDURE — 36415 COLL VENOUS BLD VENIPUNCTURE: CPT | Performed by: INTERNAL MEDICINE

## 2025-07-29 PROCEDURE — 82947 ASSAY GLUCOSE BLOOD QUANT: CPT

## 2025-07-29 PROCEDURE — 84075 ASSAY ALKALINE PHOSPHATASE: CPT | Performed by: INTERNAL MEDICINE

## 2025-07-29 PROCEDURE — 93306 TTE W/DOPPLER COMPLETE: CPT | Performed by: INTERNAL MEDICINE

## 2025-07-29 PROCEDURE — 99233 SBSQ HOSP IP/OBS HIGH 50: CPT | Performed by: INTERNAL MEDICINE

## 2025-07-29 PROCEDURE — 2500000002 HC RX 250 W HCPCS SELF ADMINISTERED DRUGS (ALT 637 FOR MEDICARE OP, ALT 636 FOR OP/ED): Performed by: INTERNAL MEDICINE

## 2025-07-29 PROCEDURE — 2500000001 HC RX 250 WO HCPCS SELF ADMINISTERED DRUGS (ALT 637 FOR MEDICARE OP)

## 2025-07-29 PROCEDURE — 99232 SBSQ HOSP IP/OBS MODERATE 35: CPT

## 2025-07-29 PROCEDURE — 99221 1ST HOSP IP/OBS SF/LOW 40: CPT | Performed by: INTERNAL MEDICINE

## 2025-07-29 PROCEDURE — 2500000001 HC RX 250 WO HCPCS SELF ADMINISTERED DRUGS (ALT 637 FOR MEDICARE OP): Performed by: INTERNAL MEDICINE

## 2025-07-29 PROCEDURE — 80202 ASSAY OF VANCOMYCIN: CPT | Performed by: PHARMACY

## 2025-07-29 PROCEDURE — 85027 COMPLETE CBC AUTOMATED: CPT | Performed by: INTERNAL MEDICINE

## 2025-07-29 RX ADMIN — PIPERACILLIN SODIUM AND TAZOBACTAM SODIUM 4.5 G: 4; .5 INJECTION, SOLUTION INTRAVENOUS at 04:01

## 2025-07-29 RX ADMIN — PIPERACILLIN SODIUM AND TAZOBACTAM SODIUM 4.5 G: 4; .5 INJECTION, SOLUTION INTRAVENOUS at 21:01

## 2025-07-29 RX ADMIN — INSULIN LISPRO 1 UNITS: 100 INJECTION, SOLUTION INTRAVENOUS; SUBCUTANEOUS at 12:00

## 2025-07-29 RX ADMIN — OXYBUTYNIN CHLORIDE 5 MG: 5 TABLET ORAL at 14:28

## 2025-07-29 RX ADMIN — CITALOPRAM HYDROBROMIDE 20 MG: 20 TABLET ORAL at 21:01

## 2025-07-29 RX ADMIN — OXYBUTYNIN CHLORIDE 5 MG: 5 TABLET ORAL at 08:46

## 2025-07-29 RX ADMIN — BISACODYL 10 MG: 10 SUPPOSITORY RECTAL at 21:57

## 2025-07-29 RX ADMIN — VANCOMYCIN 1250 MG: 1.75 INJECTION, SOLUTION INTRAVENOUS at 09:05

## 2025-07-29 RX ADMIN — INSULIN LISPRO 1 UNITS: 100 INJECTION, SOLUTION INTRAVENOUS; SUBCUTANEOUS at 16:52

## 2025-07-29 RX ADMIN — BACLOFEN 20 MG: 10 TABLET ORAL at 21:01

## 2025-07-29 RX ADMIN — PIPERACILLIN SODIUM AND TAZOBACTAM SODIUM 4.5 G: 4; .5 INJECTION, SOLUTION INTRAVENOUS at 09:06

## 2025-07-29 RX ADMIN — BACLOFEN 20 MG: 10 TABLET ORAL at 08:45

## 2025-07-29 RX ADMIN — ASPIRIN 81 MG: 81 TABLET, DELAYED RELEASE ORAL at 08:46

## 2025-07-29 RX ADMIN — ENOXAPARIN SODIUM 40 MG: 100 INJECTION SUBCUTANEOUS at 08:46

## 2025-07-29 RX ADMIN — OXYBUTYNIN CHLORIDE 5 MG: 5 TABLET ORAL at 21:01

## 2025-07-29 RX ADMIN — PANTOPRAZOLE SODIUM 40 MG: 40 TABLET, DELAYED RELEASE ORAL at 06:21

## 2025-07-29 RX ADMIN — PIPERACILLIN SODIUM AND TAZOBACTAM SODIUM 4.5 G: 4; .5 INJECTION, SOLUTION INTRAVENOUS at 16:52

## 2025-07-29 RX ADMIN — PERFLUTREN 2 ML OF DILUTION: 6.52 INJECTION, SUSPENSION INTRAVENOUS at 11:38

## 2025-07-29 RX ADMIN — VANCOMYCIN 1250 MG: 1.75 INJECTION, SOLUTION INTRAVENOUS at 21:42

## 2025-07-29 RX ADMIN — BACLOFEN 20 MG: 10 TABLET ORAL at 14:28

## 2025-07-29 ASSESSMENT — PAIN - FUNCTIONAL ASSESSMENT
PAIN_FUNCTIONAL_ASSESSMENT: 0-10
PAIN_FUNCTIONAL_ASSESSMENT: 0-10

## 2025-07-29 ASSESSMENT — PAIN SCALES - GENERAL
PAINLEVEL_OUTOF10: 0 - NO PAIN
PAINLEVEL_OUTOF10: 0 - NO PAIN

## 2025-07-29 NOTE — PROGRESS NOTES
"John Mora is a 54 y.o. male on day 2 of admission presenting with Atrial flutter, unspecified type (Multi).    Subjective   Patient resting comfortably in bed with family at bedside. No significant changes overnight.        Objective     Physical Exam  Vitals reviewed.     Cardiovascular:      Rate and Rhythm: Normal rate. Rhythm irregular.      Heart sounds: No murmur heard.     No friction rub. No gallop.   Pulmonary:      Effort: Pulmonary effort is normal.      Breath sounds: Normal breath sounds. No wheezing, rhonchi or rales.   Abdominal:      Palpations: Abdomen is soft.     Musculoskeletal:      Right lower leg: No edema.      Left lower leg: No edema.     Skin:     Findings: Erythema present.      Comments: Erythema noted to right lower extremity.      Neurological:      Mental Status: He is alert and oriented to person, place, and time.      Comments: Quadriplegic   Psychiatric:         Mood and Affect: Mood normal.         Behavior: Behavior normal.         Last Recorded Vitals  Blood pressure 115/66, pulse 105, temperature 37.1 °C (98.8 °F), temperature source Temporal, resp. rate 18, height 1.854 m (6' 1\"), weight 106 kg (233 lb 11 oz), SpO2 97%.  Intake/Output last 3 Shifts:  I/O last 3 completed shifts:  In: 1460 (13.8 mL/kg) [I.V.:1460 (13.8 mL/kg)]  Out: 6550 (61.8 mL/kg) [Urine:6550 (1.7 mL/kg/hr)]  Weight: 106 kg     Relevant Results  Results for orders placed or performed during the hospital encounter of 07/27/25 (from the past 24 hours)   POCT GLUCOSE   Result Value Ref Range    POCT Glucose 182 (H) 74 - 99 mg/dL   POCT GLUCOSE   Result Value Ref Range    POCT Glucose 207 (H) 74 - 99 mg/dL   Tissue/Wound Culture/Smear    Specimen: Wound/Tissue; Tissue/Biopsy   Result Value Ref Range    Tissue/Wound Culture/Smear No growth to date    POCT GLUCOSE   Result Value Ref Range    POCT Glucose 159 (H) 74 - 99 mg/dL   CBC   Result Value Ref Range    WBC 5.9 4.4 - 11.3 x10*3/uL    nRBC 0.0 0.0 - 0.0 " /100 WBCs    RBC 4.12 (L) 4.50 - 5.90 x10*6/uL    Hemoglobin 11.9 (L) 13.5 - 17.5 g/dL    Hematocrit 35.5 (L) 41.0 - 52.0 %    MCV 86 80 - 100 fL    MCH 28.9 26.0 - 34.0 pg    MCHC 33.5 32.0 - 36.0 g/dL    RDW 17.4 (H) 11.5 - 14.5 %    Platelets 110 (L) 150 - 450 x10*3/uL   Comprehensive metabolic panel   Result Value Ref Range    Glucose 145 (H) 74 - 99 mg/dL    Sodium 135 (L) 136 - 145 mmol/L    Potassium 3.9 3.5 - 5.3 mmol/L    Chloride 104 98 - 107 mmol/L    Bicarbonate 25 21 - 32 mmol/L    Anion Gap 10 10 - 20 mmol/L    Urea Nitrogen 7 6 - 23 mg/dL    Creatinine 0.59 0.50 - 1.30 mg/dL    eGFR >90 >60 mL/min/1.73m*2    Calcium 8.1 (L) 8.6 - 10.3 mg/dL    Albumin 3.2 (L) 3.4 - 5.0 g/dL    Alkaline Phosphatase 58 33 - 120 U/L    Total Protein 6.2 (L) 6.4 - 8.2 g/dL    AST 27 9 - 39 U/L    Bilirubin, Total 0.7 0.0 - 1.2 mg/dL    ALT 35 10 - 52 U/L   Vancomycin   Result Value Ref Range    Vancomycin 7.3 5.0 - 20.0 ug/mL   POCT GLUCOSE   Result Value Ref Range    POCT Glucose 145 (H) 74 - 99 mg/dL   Transthoracic Echo Complete   Result Value Ref Range    AV pk anirudh 1.15 m/s    LVOT diam 2.05 cm    AV mn grad 3 mmHg    MV E/A ratio 2.00     LV Biplane EF 57 %    LV EF 58 %    LVIDd 4.35 cm    AV pk grad 5 mmHg    Aortic Valve Area by Continuity of VTI 3.18 cm2    Aortic Valve Area by Continuity of Peak Velocity 2.60 cm2    LV A4C EF 51.1           Assessment & Plan  Atrial flutter, unspecified type (Multi)    GERD (gastroesophageal reflux disease)    Neurogenic bladder    Urinary tract infection    Bilateral great toes ulcers (Multi)    Muscle spasticity    Neurogenic bowel    Pressure ulcer    Quadriplegia, C1-C4, complete (Multi)    History of aplastic anemia    Cellulitis of right lower extremity    Atrial flutter  R thigh cellulitis  Possible UTI  Cervical spinal cord injury with quadriplegia  GERD  Anxiety     7/28: As above. 55 yo M with a history of cervical spinal cord injury with quadriplegia, GERD, anxiety,  and remote atrial flutter that was admitted with R thigh cellulitis and UTI. The patient denies any chest discomfort, palpitations, or SOB, though he admits that he has no feeling from his neck down. He has been started on IVFs and IV ATBs. Urine and blood cultures pending. On admission his EKG showed rate controlled atrial flutter. He has been started on subcutaneous Lovenox and a TTE has been ordered by the primary team. CHADS-VASc score is 0. Consult EP for assessment for ablation and further management. We will await results of TTE.  Continue to monitor telemetry. We will follow with you.     7/29: As above.  Patient is resting comfortably in bed this morning with family at bedside. Denies chest pain, pressure or palpitations. Denies shortness of breath. Patient remains in atrial flutter on telemetry with mildly rapid ventricular rates this morning between 90 and 110.  Echocardiogram completed this morning revealing a normal ejection fraction of 55 to 60% and otherwise trivial valvular abnormalities.  Blood pressures overall normotensive with last recorded 115/66.  He is breathing comfortably on room air with pulse oximetry of 97%. He continues on IV antibiotics for his right lower extremity cellulitis.  As stated previously we have consulted electrophysiology services for their recommendations regarding management of the patient's atrial flutter.  His [id number] score is 0 at this time and will defer initiation of anticoagulation to electrophysiology services.  Otherwise no further recommendations from our perspective and we will sign off.         Willow Cruz, APRN-CNP

## 2025-07-29 NOTE — CARE PLAN
The patient's goals for the shift include no sign of edema, stable vitals, safety    The clinical goals for the shift include maintain skin integrity, remain hemodynamically stable    Over the shift, the patient did not make progress toward the following goals. Barriers to progression include atrial flutter, RLE cellulitis. Recommendations to address these barriers include monitor VS, monitor labs, monitor I/O's, continuous telemetry monitoring, maintain skin integrity, promote oral hydration.

## 2025-07-29 NOTE — CARE PLAN
The patient's goals for the shift include no sign of edema, stable vitals, safety    The clinical goals for the shift include maintian skin integrity, remain hds, safe    Problem: Pain - Adult  Goal: Verbalizes/displays adequate comfort level or baseline comfort level  Outcome: Progressing     Problem: Safety - Adult  Goal: Free from fall injury  Outcome: Progressing     Problem: Discharge Planning  Goal: Discharge to home or other facility with appropriate resources  Outcome: Progressing     Problem: Chronic Conditions and Co-morbidities  Goal: Patient's chronic conditions and co-morbidity symptoms are monitored and maintained or improved  Outcome: Progressing     Problem: Nutrition  Goal: Nutrient intake appropriate for maintaining nutritional needs  Outcome: Progressing     Problem: Skin  Goal: Decreased wound size/increased tissue granulation at next dressing change  Outcome: Progressing  Goal: Participates in plan/prevention/treatment measures  Outcome: Progressing  Goal: Prevent/manage excess moisture  Outcome: Progressing  Goal: Prevent/minimize sheer/friction injuries  Outcome: Progressing  Flowsheets (Taken 7/29/2025 [id number])  Prevent/minimize sheer/friction injuries:   Complete micro-shifts as needed if patient unable. Adjust patient position to relieve pressure points, not a full turn   Turn/reposition every 2 hours/use positioning/transfer devices   HOB 30 degrees or less  Goal: Promote/optimize nutrition  Outcome: Progressing  Goal: Promote skin healing  Outcome: Progressing

## 2025-07-29 NOTE — PROGRESS NOTES
"John Mora is a 54 y.o. male on day 2 of admission presenting with Atrial flutter, unspecified type (Multi).    Subjective   Interval History:   Afebrile, no chills  No chest pain or shortness of breath  No nausea vomiting or diarrhea  Interval discussion with wound care nurse    Review of Systems   All other systems reviewed and are negative.      Objective   Range of Vitals (last 24 hours)  Heart Rate:  [101-117]   Temp:  [36.9 °C (98.4 °F)-37.1 °C (98.8 °F)]   Resp:  [16-18]   BP: (101-127)/(59-74)   Height:  [185.4 cm (6' 1\")]   Weight:  [104 kg (229 lb 4.5 oz)-106 kg (233 lb 11 oz)]   SpO2:  [95 %-98 %]   Daily Weight  07/29/25 : 106 kg (233 lb 11 oz)    Body mass index is 30.83 kg/m².    Physical Exam  Constitutional:       Appearance: Normal appearance.   HENT:      Head: Normocephalic and atraumatic.      Right Ear: External ear normal.      Left Ear: External ear normal.      Nose: Nose normal.      Eyes:      General: No scleral icterus.     Extraocular Movements: Extraocular movements intact.      Conjunctiva/sclera: Conjunctivae normal.         Cardiovascular:      Rate and Rhythm: Regular rhythm. Tachycardia present.      Heart sounds: Normal heart sounds.   Pulmonary:      Effort: Pulmonary effort is normal.      Breath sounds: Normal breath sounds.   Abdominal:      Palpations: Abdomen is soft.      Tenderness: There is no abdominal tenderness.      Musculoskeletal:      Cervical back: Normal range of motion and neck supple.      Right upper leg: Swelling and edema present.      Comments: Right thigh erythema with warmth and tenderness     Feet:      Comments: Bilateral distal hallux stage II ulcers     Skin:     General: Skin is warm and dry.      Comments: Right ischial ulcer with large amount of drainage and tissue      Neurological:      Mental Status: He is alert and oriented to person, place, and time.      Comments: Quadriplegia   Psychiatric:         Behavior: Behavior normal. Behavior is " "cooperative.          Antibiotics  ciprofloxacin - 500 mg  doxycycline - 100 mg  piperacillin-tazobactam - 4.5 gram/100 mL  vancomycin - 1.25 gram/250 mL    Relevant Results  Labs  Results from last 72 hours   Lab Units 07/29/25  0613 07/27/25  1729   WBC AUTO x10*3/uL 5.9 7.3   HEMOGLOBIN g/dL 11.9* 12.4*   HEMATOCRIT % 35.5* 38.2*   PLATELETS AUTO x10*3/uL 110* 120*   NEUTROS PCT AUTO %  --  88.1   LYMPHS PCT AUTO %  --  5.5   MONOS PCT AUTO %  --  5.3   EOS PCT AUTO %  --  0.1     Results from last 72 hours   Lab Units 07/29/25  0613 07/27/25  1729   SODIUM mmol/L 135* 130*   POTASSIUM mmol/L 3.9 3.7   CHLORIDE mmol/L 104 97*   CO2 mmol/L 25 25   BUN mg/dL 7 12   CREATININE mg/dL 0.59 0.61   GLUCOSE mg/dL 145* 223*   CALCIUM mg/dL 8.1* 8.6   ANION GAP mmol/L 10 12   EGFR mL/min/1.73m*2 >90 >90     Results from last 72 hours   Lab Units 07/29/25  0613 07/27/25  1729   ALK PHOS U/L 58 63   BILIRUBIN TOTAL mg/dL 0.7 1.0   PROTEIN TOTAL g/dL 6.2* 6.7   ALT U/L 35 53*   AST U/L 27 52*   ALBUMIN g/dL 3.2* 3.5     Estimated Creatinine Clearance: 125 mL/min (by C-G formula based on SCr of 0.59 mg/dL).  No results found for: \"CRP\"  Microbiology  Susceptibility data from last 14 days.  Collected Specimen Info Organism   07/27/25 Urine from Clean Catch/Voided Escherichia coli     Imaging  Transthoracic Echo Complete  Result Date: 7/29/2025           [place] [address]            Phone [phone] TRANSTHORACIC ECHOCARDIOGRAM REPORT Patient Name:       EMELINA Watters Physician:    [id number] Desean Murphy MD Study Date:         7/29/2025            Ordering Provider:    [id number] MARIUSZ RAMIREZ MRN/PID:            [id number]             Fellow: Accession#:         [id number]         Nurse: Date of Birth/Age:  1970 / 54 years Sonographer:          Scott Membreno                                                   "              RDCS Gender Assigned at  M                    Additional Staff: Birth: Height:             180.34 cm            Admit Date: Weight:             106.59 kg            Admission Status:     Inpatient -                                                                Routine BSA / BMI:          2.26 m2 / 32.78      Department Location:  [facility]                     kg/m2 Blood Pressure: 101 /66 mmHg Study Type:    TRANSTHORACIC ECHO (TTE) COMPLETE Diagnosis/ICD: Abnormal electrocardiogram [ECG] [EKG]-R94.31 Indication:    Abnormal EKG CPT Codes:     Echo Complete w Full Doppler-[id number] Patient History: Pertinent History: HTN, Hyperlipidemia, LE Edema and Murmur. GERD, Abn Ekg. Study Detail: The following Echo studies were performed: 2D, M-Mode, Doppler and               color flow. Technically challenging study due to poor acoustic               windows, body habitus and patient lying in supine position.               Definity used as a contrast agent for endocardial border               definition. Total contrast used for this procedure was 2 mL via IV               push.  PHYSICIAN INTERPRETATION: Left Ventricle: Left ventricular ejection fraction is normal by visual estimate at 55-60%. The patient is in atrial fibrillation/flutter which may influence the estimate of left ventricular function and transvalvular flows. There are no regional wall motion abnormalities. The left ventricular cavity size is normal. There is mild increased septal and mildly increased posterior left ventricular wall thickness. There is left ventricular concentric remodeling. Left ventricular diastolic filling was indeterminate due to atrial fibrillation/flutter. Left Atrium: The left atrial size is normal. Right Ventricle: The right ventricle is normal in size. There is normal right ventricular global systolic function. Right Atrium: The right atrial size is normal. Aortic Valve: The aortic valve is trileaflet. The aortic valve  area by VTI is 3.18 cmï¿½ with a peak velocity of 1.15 m/s. The peak and mean gradients are 5 mmHg and 3 mmHg, respectively, with a dimensionless index of 0.96. There is no evidence of aortic valve regurgitation. Mitral Valve: The mitral valve is normal in structure. The doppler estimated peak and mean diastolic gradients are 7 mmHg and 1 mmHg, respectively. There is trace mitral valve regurgitation. The E Vmax is 0.68 m/s. Tricuspid Valve: The tricuspid valve is structurally normal. There is trace tricuspid regurgitation. The right ventricular systolic pressure could not be estimated. Pulmonic Valve: The pulmonic valve is structurally normal. There is physiologic pulmonic valve regurgitation. Pericardium: No pericardial effusion noted. Aorta: The aortic root is normal.  CONCLUSIONS:  1. Left ventricular ejection fraction is normal by visual estimate at 55-60%.  2. Poorly visualized anatomical structures due to suboptimal image quality.  3. Left ventricular diastolic filling was indeterminate due to atrial fibrillation/flutter.  4. There is normal right ventricular global systolic function.  5. Trace mitral valve regurgitation.  6. Trace tricuspid regurgitation is visualized.  7. The patient is in atrial fibrillation/flutter which may influence the estimate of left ventricular function and transvalvular flows. QUANTITATIVE DATA SUMMARY:  2D MEASUREMENTS:             Normal Ranges: Ao Root s:       3.28 cm IVSd:            1.19 cm     (0.6-1.1cm) LVPWd:           1.23 cm     (0.6-1.1cm) LVIDd:           4.35 cm     (3.9-5.9cm) LVIDs:           2.79 cm LV Mass Index:   84.0 g/m2 LVEDV Index:     53.74 ml/m2 LV % FS          35.9 %  LEFT ATRIUM:                 Normal Ranges: LA Vol A4C:       64.4 ml LA Vol A2C:       45.0 ml LA Vol Index BSA: 24.2 ml/m2  LV SYSTOLIC FUNCTION:                      Normal Ranges: EF-A4C View:    51 % (>=55%) EF-A2C View:    62 % EF-Biplane:     57 % EF-Visual:      58 % LV EF Reported:  58 %  LV DIASTOLIC FUNCTION:          Normal Ranges: MV Peak E:             0.68 m/s (0.7-1.2 m/s) MV Peak A:             0.34 m/s (0.42-0.7 m/s) E/A Ratio:             2.00     (1.0-2.2)  MITRAL VALVE:          Normal Ranges: MV Vmax:      1.28 m/s (<=1.3m/s) MV peak P.6 mmHg (<5mmHg) MV mean P.5 mmHg (<48mmHg) MV DT:        118 msec (150-240msec)  AORTIC VALVE:                     Normal Ranges: AoV Vmax:                1.15 m/s (<=1.7m/s) AoV Peak P.3 mmHg (<20mmHg) AoV Mean P.7 mmHg (1.7-11.5mmHg) LVOT Max Frantz:            0.90 m/s (<=1.1m/s) AoV VTI:                 16.87 cm (18-25cm) LVOT VTI:                16.17 cm LVOT Diameter:           2.05 cm  (1.8-2.4cm) AoV Area, VTI:           3.18 cm2 (2.5-5.5cm2) AoV Area,Vmax:           2.60 cm2 (2.5-4.5cm2) AoV Dimensionless Index: 0.96  PULMONIC VALVE:          Normal Ranges: PV Max Frantz:     0.9 m/s  (0.6-0.9m/s) PV Max PG:      3.3 mmHg  [id number] Desean Murphy MD Electronically signed on 2025 at 11:53:00 AM  ** Final **     ECG 12 lead  Result Date: 2025  Atrial flutter with variable AV block Nonspecific intraventricular block Abnormal ECG When compared with ECG of 1993 16:10, Atrial flutter has replaced Sinus rhythm Vent. rate has increased BY  45 BPM Nonspecific T wave abnormality now evident in Inferior leads QT has lengthened Confirmed by Mateo Mercado ([id number]) on 2025 8:22:25 AM    Lower extremity venous duplex right  Result Date: 2025  Interpreted By:  Hieu Ames, STUDY: [facility] LOWER EXTREMITY VENOUS DUPLEX RIGHT;  2025 6:52 pm   INDICATION: Signs/Symptoms:Atraumatic pain and swelling.   COMPARISON: None.   ACCESSION NUMBER(S): [id number]   ORDERING CLINICIAN: LESLEY LIVINGSTON   TECHNIQUE: Vascular ultrasound of the right lower extremity was performed. Real-time compression views as well as Gray scale, color Doppler and spectral Doppler waveform analysis was performed.   FINDINGS:  Evaluation of the visualized portions of the right common femoral vein, proximal, mid, and distal femoral vein, and popliteal vein were performed.  Evaluation of the visualized portions of the  posterior tibial and peroneal veins were also performed.   Limitations:  Limited evaluation the calf veins.   The evaluated veins demonstrate normal compressibility. There is intact venous flow demonstrating normal respiratory variability and normal augmentation of flow with calf compression. Therefore, there is no ultrasonographic evidence for deep vein thrombosis within the evaluated veins.       No sonographic evidence for deep vein thrombosis within the evaluated veins of the right lower extremity.   MACRO: None   Signed by: Hieu Ames 7/27/2025 7:30 PM Dictation workstation:   [id number]    XR chest 1 view  Result Date: 7/27/2025  Interpreted By:  Reanna Verma, STUDY: Chest, single AP view.   INDICATION: Signs/Symptoms:Malaise, new onset atrial flutter.   COMPARISON: None.   ACCESSION NUMBER(S): [id number]   ORDERING CLINICIAN: LESLEY LIVINGSTON   FINDINGS: The cardiac silhouette size is within normal limits. There is no focal consolidation, edema or pneumothorax. Questionable trace right pleural effusion with blunting of the costophrenic angle. No acute osseous abnormality.       1. Questionable trace right pleural effusion.   MACRO: None.   Signed by: Reanna Verma 7/27/2025 6:42 PM Dictation workstation:   [id number]    XR femur right 2+ views  Result Date: 7/27/2025  Interpreted By:  Reanna Verma, STUDY: Right femur, two views.   INDICATION: Signs/Symptoms:Atraumatic pain and swelling.   COMPARISON: CT pelvis 01/03/2023.   ACCESSION NUMBER(S): [id number]   ORDERING CLINICIAN: LESLEY LIVINGSTON   FINDINGS: No acute fracture or malalignment. Severe right hip osteoarthrosis with joint space loss and osteophytes. Moderate to severe right knee osteoarthrosis as well. Bones appear demineralized. Extensive calcific  deposits are visualized in the lateral aspect of the femoral neck and inferior aspect of the ischial tuberosity likely representing hydroxyapatite deposition, also noted on previous CT.       1. Severe right hip and moderate to severe right knee osteoarthrosis. 2. Extensive calcific deposits are visualized in the lateral aspect of the femoral neck and inferior aspect of the ischial tuberosity likely representing hydroxyapatite deposition, also noted on previous CT. 3. No acute fracture or malalignment.   MACRO:   None.   Signed by: Reanna Verma 7/27/2025 6:41 PM Dictation workstation:   [id number]     Assessment/Plan   Right thigh cellulitis  Resolved lactic acidosis  History of MRSA  E. coli urinary tract infection     Continue vancomycin  Continue Zosyn  Follow-up blood cultures  Follow-up urine culture  Follow-up wound culture  Local care right thigh ulcer  Offloading  Supportive care  Monitor temperature and WBC  Further recommendations based on culture results     This is a complex infectious disease issue and the following was performed today (for more details please see the above note): Management decisions reflecting the added complexity (e.g., changes in antimicrobial therapy, infection control strategies).     Alexander Rosales MD

## 2025-07-29 NOTE — PROGRESS NOTES
Vancomycin Dosing by Pharmacy- FOLLOW UP    John Mora is a 54 y.o. year old male who Pharmacy has been consulted for vancomycin dosing for cellulitis, skin and soft tissue. Based on the patient's indication and renal status this patient is being dosed based on a goal AUC of 400-600.     Renal function is currently stable.    Current vancomycin dose: 1250 mg given every 12 hours    Estimated vancomycin AUC on current dose: 467 mg/L.hr     Visit Vitals  /66 (BP Location: Left arm, Patient Position: Lying)   Pulse 109   Temp 36.9 °C (98.4 °F) (Temporal)   Resp 17        Lab Results   Component Value Date    CREATININE 0.59 2025    CREATININE 0.61 2025    CREATININE 0.5 2023    CREATININE 0.6 2023    CREATININE 0.6 2022    CREATININE 0.7 2018        Patient weight is as follows:   Vitals:    25 0400   Weight: 106 kg (233 lb 11 oz)       Cultures:  No results found for the encounter in last 14 days.       I/O last 3 completed shifts:  In: 1460 (13.8 mL/kg) [I.V.:1460 (13.8 mL/kg)]  Out: 6550 (61.8 mL/kg) [Urine:6550 (1.7 mL/kg/hr)]  Weight: 106 kg   I/O during current shift:  No intake/output data recorded.    Temp (24hrs), Av.9 °C (98.5 °F), Min:36.9 °C (98.4 °F), Max:37.2 °C (99 °F)      Assessment/Plan    Within goal AUC range. Continue current vancomycin regimen.    This dosing regimen is predicted by [employer] to result in the following pharmacokinetic parameters:    Regimen: 1250 mg IV every 12 hours.  Start time: 09:40 on 2025  Exposure target: AUC24 (range) 400-600 mg/L.hr   [id number]: 420 mg/L.hr  AUC24,ss: 467 mg/L.hr  Probability of AUC24 > 400: 75 %  Ctrough,ss: 14.3 mg/L  Probability of Ctrough,ss > 20: 15 %    The next level will be obtained on  at 0500. May be obtained sooner if clinically indicated.   Will continue to monitor renal function daily while on vancomycin and order serum creatinine at least every 48 hours if not already  ordered.  Follow for continued vancomycin needs, clinical response, and signs/symptoms of toxicity.       Jose Warren, [facility]

## 2025-07-29 NOTE — PROGRESS NOTES
07/29/25 1422   Discharge Planning   Who is requesting discharge planning? Provider   Home or Post Acute Services In home services   Expected Discharge Disposition Home   Does the patient need discharge transport arranged? Yes   [facility] coordination needed? Yes     DISCHARGE PLAN TO RETURN HOME WHEN MEDICALLY CLEAR.     [facility] [employer], FATHER IS PRIMARY CAREGIVER.

## 2025-07-29 NOTE — CONSULTS
Inpatient consult to Cardiology  Consult performed by: Alfredito Parrish MD  Consult ordered by: MANUEL Harrison-CNP  Reason for consult: Atrial flutter  Assessment/Recommendations: Conservative management for now.  The patient will consider catheter-based therapy and/or cardioversion if the flutter does not terminate over the next few weeks        History Of Present Illness:    John Mora is a 54 y.o. male with a history of cervical cord spinal injury and quadriplegia.  He was admitted with cellulitis was found to be in typical atrial flutter with variable A-V conduction.  This is in the setting of preserved LV systolic function and no significant valvular heart disease.  The patient has no symptoms.  He is not anticoagulated given his [id number] score of 0.  He notes that he has had atrial flutter diagnosed initially about 20 years ago in the setting infection but he often has recurrence during states of infection and stress.  He has never required cardioversion he always reverts back to sinus rhythm on his own.  His father is at his bedside and corroborates this same information.     Last Recorded Vitals:  Vitals:    07/29/25 0048 07/29/25 0400 07/29/25 0748 07/29/25 1222   BP: 127/74 101/66 115/66 105/65   BP Location: Left arm Left arm Right arm Right arm   Patient Position: Lying Lying Lying Lying   Pulse:   105 107   Resp: 17 17 18 19   Temp: 36.9 °C (98.4 °F) 36.9 °C (98.4 °F) 37.1 °C (98.8 °F) 36.8 °C (98.2 °F)   TempSrc: Temporal Temporal Temporal Temporal   SpO2: 95% 95% 97% 96%   Weight:  106 kg (233 lb 11 oz)     Height:           Last Labs:  CBC - 7/29/2025:  6:13 AM  5.9 11.9 110    35.5      CMP - 7/29/2025:  6:13 AM  8.1 6.2 27 --- 0.7   _ 3.2 35 58      PTT - No results in last year.  _   _ _     Troponin I, High Sensitivity   Date/Time Value Ref Range Status   07/27/2025 07:23 PM 6 0 - 20 ng/L Final   07/27/2025 05:29 PM 6 0 - 20 ng/L Final     Hemoglobin A1C   Date/Time Value Ref  Range Status   07/27/2025 05:29 PM 5.9 (H) See comment % Final     LDL Calculated   Date/Time Value Ref Range Status   01/04/2023 07:31  (H) 65 - 130 MG/DL Final   07/22/2022 12:19  (H) 65 - 130 MG/DL Final      Last I/O:  I/O last 3 completed shifts:  In: 1460 (13.8 mL/kg) [I.V.:1460 (13.8 mL/kg)]  Out: 6550 (61.8 mL/kg) [Urine:6550 (1.7 mL/kg/hr)]  Weight: 106 kg     Past Cardiology Tests (Last 3 Years):  EKG:  ECG 12 lead 07/27/2025    Echo:  Transthoracic Echo Complete 07/29/2025    Ejection Fractions:  EF   Date/Time Value Ref Range Status   07/29/2025 11:12 AM 58 %      Cath:  No results found for this or any previous visit from the past 1095 days.    Stress Test:  No results found for this or any previous visit from the past 1095 days.    Cardiac Imaging:  No results found for this or any previous visit from the past 1095 days.      Past Medical History:  He has a past medical history of Other conditions influencing health status, Other conditions influencing health status, Personal history of diseases of the blood and blood-forming organs and certain disorders involving the immune mechanism, Personal history of other endocrine, nutritional and metabolic disease, and Unspecified convulsions (Multi).    Past Surgical History:  He has a past surgical history that includes Other surgical history (04/16/2013).      Social History:  He reports that he has never smoked. He has never used smokeless tobacco. He reports that he does not drink alcohol and does not use drugs.    Family History:  Family History[1]     Allergies:  Lorazepam    Inpatient Medications:  Scheduled Medications[2]  PRN Medications[3]  Continuous Medications[4]  Outpatient Medications:  Current Outpatient Medications   Medication Instructions    aspirin 81 mg    baclofen (LIORESAL) 20 mg, oral, 3 times daily    bisacodyl (DULCOLAX) 10 mg    ciprofloxacin (Cipro) 500 mg tablet Take 1 Tablet by mouth as needed (Take twice a day x 3  days following catheter changes).    citalopram (CELEXA) 20 mg, oral, Daily    clobetasol (Olux) 0.05 % topical foam Topical, 2 times daily    doxycycline (VIBRAMYCIN) 100 mg, oral, 2 times daily, Take with at least 8 ounces (large glass) of water, do not lie down for 30 minutes after    ketoconazole (NIZOral) 2 % shampoo Topical, 2 times weekly, Shampoo daily, leave on for 5-10 minutes, then rinse.    omeprazole (PriLOSEC) 20 mg DR capsule 1 capsule, Every 12 hours scheduled (0630,1830)    omeprazole OTC (PRILOSEC OTC) 20 mg, oral, 2 times daily before meals, Do not crush, chew, or split.    tolterodine (DETROL) 1 mg, oral, Daily    triamcinolone (Kenalog) 0.5 % cream Topical, 3 times daily, Apply to affected areas 2-3 times daily    wound dressings paste 1 Application, Every other day       Physical Exam:  General: Alert, oriented to person, place, date/time, pleasant  HEENT: Normocephalic, eyes normal ears normal  Heart: Normal S1, S2 , rhythm irregular, no rubs or gallops   Respiratory: Lungs clear to auscultation bilaterally, no rales, crackles, wheezes or rhonchi  Abdomen: Bowel sounds present in all quadrants  Extremities: No upper or lower extremity edema appreciated  Dermatology: Skin Normal  Genitourinary: Deferred  Neurological paralyzed from neck below  Psychology :Appropriate affect and mood      Assessment/Plan   Typical atrial flutter  I discussed options with the patient and his father including catheter-based therapy as well as cardioversion if he does not convert on his own.  He will revisit this with me as an outpatient in 6 weeks.  In the event that he does require cardioversion and/or catheter-based therapy he will require short-term anticoagulation during the periprocedural window despite his XVE9HJ4-SGAr score of 0.  For now he does not require anything more than aspirin.  Peripheral IV 07/27/25 20 G Anterior;Right Forearm (Active)   Site Assessment Clean;Dry;Intact 07/29/25 0859   Dressing  Status Dry;Clean;Occlusive 07/29/25 0857   Number of days: 2       Code Status:  Full Code    I spent 45 minutes in the professional and overall care of this patient.        Alfredito Parrish MD       [1] No family history on file.  [2]   Scheduled medications   Medication Dose Route Frequency    aspirin  81 mg oral Daily    baclofen  20 mg oral TID    citalopram  20 mg oral Nightly    docusate sodium  100 mg oral BID    enoxaparin  40 mg subcutaneous q24h GHADA    insulin lispro  0-5 Units subcutaneous TID AC    oxyBUTYnin  5 mg oral TID    pantoprazole  40 mg oral Daily before breakfast    Or    pantoprazole  40 mg intravenous Daily before breakfast    perflutren protein A microsphere  0.5 mL intravenous Once in imaging    piperacillin-tazobactam  4.5 g intravenous q6h    polyethylene glycol  17 g oral Daily    sulfur hexafluoride microsphr  2 mL intravenous Once in imaging    vancomycin  1,250 mg intravenous q12h   [3]   PRN medications   Medication    acetaminophen    Or    acetaminophen    Or    acetaminophen    acetaminophen    Or    acetaminophen    Or    acetaminophen    benzocaine-menthol    bisacodyl    dextromethorphan-guaifenesin    dextrose    dextrose    glucagon    glucagon    guaiFENesin    melatonin    ondansetron    Or    ondansetron    vancomycin   [4]   Continuous Medications   Medication Dose Last Rate

## 2025-07-30 ENCOUNTER — HOME HEALTH ADMISSION (OUTPATIENT)
Dept: HOME HEALTH SERVICES | Facility: HOME HEALTH | Age: 55
End: 2025-07-30
Payer: COMMERCIAL

## 2025-07-30 ENCOUNTER — APPOINTMENT (OUTPATIENT)
Dept: RADIOLOGY | Facility: HOSPITAL | Age: 55
End: 2025-07-30
Payer: COMMERCIAL

## 2025-07-30 ENCOUNTER — APPOINTMENT (OUTPATIENT)
Dept: CARDIOLOGY | Facility: HOSPITAL | Age: 55
End: 2025-07-30
Payer: COMMERCIAL

## 2025-07-30 DIAGNOSIS — N39.0 URINARY TRACT INFECTION ASSOCIATED WITH CATHETERIZATION OF URINARY TRACT, UNSPECIFIED INDWELLING URINARY CATHETER TYPE, INITIAL ENCOUNTER: ICD-10-CM

## 2025-07-30 DIAGNOSIS — T83.511A URINARY TRACT INFECTION ASSOCIATED WITH CATHETERIZATION OF URINARY TRACT, UNSPECIFIED INDWELLING URINARY CATHETER TYPE, INITIAL ENCOUNTER: ICD-10-CM

## 2025-07-30 DIAGNOSIS — L03.115 CELLULITIS OF RIGHT LOWER EXTREMITY: ICD-10-CM

## 2025-07-30 LAB
ANION GAP SERPL CALCULATED.3IONS-SCNC: 11 MMOL/L (ref 10–20)
BACTERIA SPEC CULT: ABNORMAL
BACTERIA SPEC CULT: ABNORMAL
BACTERIA UR CULT: ABNORMAL
BUN SERPL-MCNC: 8 MG/DL (ref 6–23)
CALCIUM SERPL-MCNC: 8.2 MG/DL (ref 8.6–10.3)
CHLORIDE SERPL-SCNC: 102 MMOL/L (ref 98–107)
CO2 SERPL-SCNC: 24 MMOL/L (ref 21–32)
CREAT SERPL-MCNC: 0.56 MG/DL (ref 0.5–1.3)
EGFRCR SERPLBLD CKD-EPI 2021: >90 ML/MIN/1.73M*2
ERYTHROCYTE [DISTWIDTH] IN BLOOD BY AUTOMATED COUNT: 17.4 % (ref 11.5–14.5)
GLUCOSE BLD MANUAL STRIP-MCNC: 133 MG/DL (ref 74–99)
GLUCOSE BLD MANUAL STRIP-MCNC: 150 MG/DL (ref 74–99)
GLUCOSE BLD MANUAL STRIP-MCNC: 155 MG/DL (ref 74–99)
GLUCOSE BLD MANUAL STRIP-MCNC: 165 MG/DL (ref 74–99)
GLUCOSE SERPL-MCNC: 148 MG/DL (ref 74–99)
GRAM STN SPEC: ABNORMAL
GRAM STN SPEC: ABNORMAL
HCT VFR BLD AUTO: 37.3 % (ref 41–52)
HGB BLD-MCNC: 11.7 G/DL (ref 13.5–17.5)
MCH RBC QN AUTO: 28 PG (ref 26–34)
MCHC RBC AUTO-ENTMCNC: 31.4 G/DL (ref 32–36)
MCV RBC AUTO: 89 FL (ref 80–100)
NRBC BLD-RTO: 0 /100 WBCS (ref 0–0)
PLATELET # BLD AUTO: 122 X10*3/UL (ref 150–450)
POTASSIUM SERPL-SCNC: 3.9 MMOL/L (ref 3.5–5.3)
RBC # BLD AUTO: 4.18 X10*6/UL (ref 4.5–5.9)
SODIUM SERPL-SCNC: 133 MMOL/L (ref 136–145)
WBC # BLD AUTO: 5.1 X10*3/UL (ref 4.4–11.3)

## 2025-07-30 PROCEDURE — 80048 BASIC METABOLIC PNL TOTAL CA: CPT | Performed by: INTERNAL MEDICINE

## 2025-07-30 PROCEDURE — C1751 CATH, INF, PER/CENT/MIDLINE: HCPCS

## 2025-07-30 PROCEDURE — 71045 X-RAY EXAM CHEST 1 VIEW: CPT

## 2025-07-30 PROCEDURE — 71045 X-RAY EXAM CHEST 1 VIEW: CPT | Performed by: STUDENT IN AN ORGANIZED HEALTH CARE EDUCATION/TRAINING PROGRAM

## 2025-07-30 PROCEDURE — 2500000004 HC RX 250 GENERAL PHARMACY W/ HCPCS (ALT 636 FOR OP/ED): Performed by: INTERNAL MEDICINE

## 2025-07-30 PROCEDURE — 99233 SBSQ HOSP IP/OBS HIGH 50: CPT | Performed by: INTERNAL MEDICINE

## 2025-07-30 PROCEDURE — 2500000001 HC RX 250 WO HCPCS SELF ADMINISTERED DRUGS (ALT 637 FOR MEDICARE OP)

## 2025-07-30 PROCEDURE — 36573 INSJ PICC RS&I 5 YR+: CPT

## 2025-07-30 PROCEDURE — 2500000002 HC RX 250 W HCPCS SELF ADMINISTERED DRUGS (ALT 637 FOR MEDICARE OP, ALT 636 FOR OP/ED): Performed by: INTERNAL MEDICINE

## 2025-07-30 PROCEDURE — 2500000004 HC RX 250 GENERAL PHARMACY W/ HCPCS (ALT 636 FOR OP/ED): Performed by: PHARMACY

## 2025-07-30 PROCEDURE — 85027 COMPLETE CBC AUTOMATED: CPT | Performed by: INTERNAL MEDICINE

## 2025-07-30 PROCEDURE — 36415 COLL VENOUS BLD VENIPUNCTURE: CPT | Performed by: INTERNAL MEDICINE

## 2025-07-30 PROCEDURE — 2780000003 HC OR 278 NO HCPCS

## 2025-07-30 PROCEDURE — 2500000001 HC RX 250 WO HCPCS SELF ADMINISTERED DRUGS (ALT 637 FOR MEDICARE OP): Performed by: INTERNAL MEDICINE

## 2025-07-30 PROCEDURE — 02HV33Z INSERTION OF INFUSION DEVICE INTO SUPERIOR VENA CAVA, PERCUTANEOUS APPROACH: ICD-10-PCS | Performed by: INTERNAL MEDICINE

## 2025-07-30 PROCEDURE — 1200000002 HC GENERAL ROOM WITH TELEMETRY DAILY

## 2025-07-30 PROCEDURE — 82947 ASSAY GLUCOSE BLOOD QUANT: CPT

## 2025-07-30 RX ORDER — ERTAPENEM 1 G/1
1 INJECTION, POWDER, LYOPHILIZED, FOR SOLUTION INTRAMUSCULAR; INTRAVENOUS EVERY 24 HOURS
Status: DISCONTINUED | OUTPATIENT
Start: 2025-07-30 | End: 2025-07-31 | Stop reason: HOSPADM

## 2025-07-30 RX ORDER — LIDOCAINE HYDROCHLORIDE 10 MG/ML
5 INJECTION, SOLUTION INFILTRATION; PERINEURAL ONCE
Status: DISCONTINUED | OUTPATIENT
Start: 2025-07-30 | End: 2025-07-31 | Stop reason: HOSPADM

## 2025-07-30 RX ORDER — MEROPENEM AND SODIUM CHLORIDE 1 G/50ML
1 INJECTION, SOLUTION INTRAVENOUS EVERY 8 HOURS
Status: DISCONTINUED | OUTPATIENT
Start: 2025-07-30 | End: 2025-07-30

## 2025-07-30 RX ADMIN — ASPIRIN 81 MG: 81 TABLET, DELAYED RELEASE ORAL at 08:47

## 2025-07-30 RX ADMIN — VANCOMYCIN 1250 MG: 1.75 INJECTION, SOLUTION INTRAVENOUS at 13:28

## 2025-07-30 RX ADMIN — ERTAPENEM SODIUM 1 G: 1 INJECTION, POWDER, LYOPHILIZED, FOR SOLUTION INTRAMUSCULAR; INTRAVENOUS at 19:32

## 2025-07-30 RX ADMIN — BACLOFEN 20 MG: 10 TABLET ORAL at 17:55

## 2025-07-30 RX ADMIN — OXYBUTYNIN CHLORIDE 5 MG: 5 TABLET ORAL at 20:38

## 2025-07-30 RX ADMIN — INSULIN LISPRO 1 UNITS: 100 INJECTION, SOLUTION INTRAVENOUS; SUBCUTANEOUS at 13:28

## 2025-07-30 RX ADMIN — CITALOPRAM HYDROBROMIDE 20 MG: 20 TABLET ORAL at 20:39

## 2025-07-30 RX ADMIN — OXYBUTYNIN CHLORIDE 5 MG: 5 TABLET ORAL at 17:55

## 2025-07-30 RX ADMIN — BACLOFEN 20 MG: 10 TABLET ORAL at 20:38

## 2025-07-30 RX ADMIN — PIPERACILLIN SODIUM AND TAZOBACTAM SODIUM 4.5 G: 4; .5 INJECTION, SOLUTION INTRAVENOUS at 11:17

## 2025-07-30 RX ADMIN — PANTOPRAZOLE SODIUM 40 MG: 40 TABLET, DELAYED RELEASE ORAL at 06:21

## 2025-07-30 RX ADMIN — OXYBUTYNIN CHLORIDE 5 MG: 5 TABLET ORAL at 08:47

## 2025-07-30 RX ADMIN — BACLOFEN 20 MG: 10 TABLET ORAL at 08:47

## 2025-07-30 RX ADMIN — PIPERACILLIN SODIUM AND TAZOBACTAM SODIUM 4.5 G: 4; .5 INJECTION, SOLUTION INTRAVENOUS at 03:50

## 2025-07-30 RX ADMIN — ENOXAPARIN SODIUM 40 MG: 100 INJECTION SUBCUTANEOUS at 08:47

## 2025-07-30 ASSESSMENT — COGNITIVE AND FUNCTIONAL STATUS - GENERAL
EATING MEALS: TOTAL
MOBILITY SCORE: 6
MOVING FROM LYING ON BACK TO SITTING ON SIDE OF FLAT BED WITH BEDRAILS: TOTAL
CLIMB 3 TO 5 STEPS WITH RAILING: TOTAL
MOVING TO AND FROM BED TO CHAIR: TOTAL
HELP NEEDED FOR BATHING: TOTAL
TOILETING: TOTAL
WALKING IN HOSPITAL ROOM: TOTAL
PERSONAL GROOMING: TOTAL
DAILY ACTIVITIY SCORE: 6
TURNING FROM BACK TO SIDE WHILE IN FLAT BAD: TOTAL
DRESSING REGULAR UPPER BODY CLOTHING: TOTAL
STANDING UP FROM CHAIR USING ARMS: TOTAL
DRESSING REGULAR LOWER BODY CLOTHING: TOTAL

## 2025-07-30 ASSESSMENT — PAIN - FUNCTIONAL ASSESSMENT: PAIN_FUNCTIONAL_ASSESSMENT: 0-10

## 2025-07-30 ASSESSMENT — PAIN SCALES - GENERAL
PAINLEVEL_OUTOF10: 0 - NO PAIN
PAINLEVEL_OUTOF10: 0 - NO PAIN

## 2025-07-30 NOTE — PROGRESS NOTES
"John Mora is a 54 y.o. male on day 2 of admission presenting with Atrial flutter, unspecified type (Multi).    Subjective   Patient is doing better today       Objective     Physical Exam  Vitals reviewed.   Constitutional:       Appearance: Normal appearance.   HENT:      Head: Normocephalic and atraumatic.      Right Ear: Tympanic membrane, ear canal and external ear normal.      Left Ear: Tympanic membrane, ear canal and external ear normal.      Nose: Nose normal.      Mouth/Throat:      Pharynx: Oropharynx is clear.     Eyes:      Extraocular Movements: Extraocular movements intact.      Conjunctiva/sclera: Conjunctivae normal.      Pupils: Pupils are equal, round, and reactive to light.       Cardiovascular:      Rate and Rhythm: Normal rate and regular rhythm.      Pulses: Normal pulses.      Heart sounds: Normal heart sounds.   Pulmonary:      Effort: Pulmonary effort is normal.      Breath sounds: Normal breath sounds.   Abdominal:      General: Abdomen is flat. Bowel sounds are normal.      Palpations: Abdomen is soft.     Musculoskeletal:      Cervical back: Normal range of motion and neck supple.     Skin:     General: Skin is warm and dry.      Findings: Erythema present.      Comments: Right leg erythema  extending to the lower leg     Neurological:      Mental Status: He is alert and oriented to person, place, and time. Mental status is at baseline.      Motor: Weakness present.      Coordination: Coordination abnormal.      Gait: Gait abnormal.      Comments: Quadriplegic   Psychiatric:         Mood and Affect: Mood normal.         Last Recorded Vitals  Blood pressure 110/68, pulse 110, temperature 36.9 °C (98.4 °F), temperature source Temporal, resp. rate 16, height 1.854 m (6' 1\"), weight 106 kg (233 lb 11 oz), SpO2 98%.  Intake/Output last 3 Shifts:  I/O last 3 completed shifts:  In: 2015 (19 mL/kg) [I.V.:2015 (19 mL/kg)]  Out: 7700 (72.6 mL/kg) [Urine:7700 (2 mL/kg/hr)]  Weight: 106 kg "     Relevant Results             Results for orders placed or performed during the hospital encounter of 07/27/25 (from the past 24 hours)   CBC   Result Value Ref Range    WBC 5.9 4.4 - 11.3 x10*3/uL    nRBC 0.0 0.0 - 0.0 /100 WBCs    RBC 4.12 (L) 4.50 - 5.90 x10*6/uL    Hemoglobin 11.9 (L) 13.5 - 17.5 g/dL    Hematocrit 35.5 (L) 41.0 - 52.0 %    MCV 86 80 - 100 fL    MCH 28.9 26.0 - 34.0 pg    MCHC 33.5 32.0 - 36.0 g/dL    RDW 17.4 (H) 11.5 - 14.5 %    Platelets 110 (L) 150 - 450 x10*3/uL   Comprehensive metabolic panel   Result Value Ref Range    Glucose 145 (H) 74 - 99 mg/dL    Sodium 135 (L) 136 - 145 mmol/L    Potassium 3.9 3.5 - 5.3 mmol/L    Chloride 104 98 - 107 mmol/L    Bicarbonate 25 21 - 32 mmol/L    Anion Gap 10 10 - 20 mmol/L    Urea Nitrogen 7 6 - 23 mg/dL    Creatinine 0.59 0.50 - 1.30 mg/dL    eGFR >90 >60 mL/min/1.73m*2    Calcium 8.1 (L) 8.6 - 10.3 mg/dL    Albumin 3.2 (L) 3.4 - 5.0 g/dL    Alkaline Phosphatase 58 33 - 120 U/L    Total Protein 6.2 (L) 6.4 - 8.2 g/dL    AST 27 9 - 39 U/L    Bilirubin, Total 0.7 0.0 - 1.2 mg/dL    ALT 35 10 - 52 U/L   Vancomycin   Result Value Ref Range    Vancomycin 7.3 5.0 - 20.0 ug/mL   POCT GLUCOSE   Result Value Ref Range    POCT Glucose 145 (H) 74 - 99 mg/dL   Transthoracic Echo Complete   Result Value Ref Range    AV pk anirudh 1.15 m/s    LVOT diam 2.05 cm    AV mn grad 3 mmHg    MV E/A ratio 2.00     LV Biplane EF 57 %    LV EF 58 %    LVIDd 4.35 cm    AV pk grad 5 mmHg    Aortic Valve Area by Continuity of VTI 3.18 cm2    Aortic Valve Area by Continuity of Peak Velocity 2.60 cm2    LV A4C EF 51.1    POCT GLUCOSE   Result Value Ref Range    POCT Glucose 152 (H) 74 - 99 mg/dL   POCT GLUCOSE   Result Value Ref Range    POCT Glucose 154 (H) 74 - 99 mg/dL                    Assessment & Plan  Atrial flutter, unspecified type (Multi)    Urinary tract infection    Cellulitis of right lower extremity    Quadriplegia, C1-C4, complete (Multi)    Muscle  spasticity    GERD (gastroesophageal reflux disease)    Bilateral great toes ulcers (Multi)    Neurogenic bladder    Neurogenic bowel    History of aplastic anemia    Pressure ulcer     continue Zosyn and vancomycin  Still quite extensive cellulitis of right leg  ID input pending  Atrial flutter related to autonomic dysfunction  Cardiology input noted  Echo and further recommendation from EPS  Air mattress   Wound care for toe wound and hip wound  Monitor blood sugars  Sliding scale insulin  Blood sugars are coming down  See orders for details      I spent  minutes in the professional and overall care of this patient.      Marta Aldridge MD

## 2025-07-30 NOTE — CARE PLAN
The patient's goals for the shift include no sign of edema, stable vitals, safety    The clinical goals for the shift include remain hemodynamically stable, remain free from falls, maintain skin integrity    Over the shift, the patient did not make progress toward the following goals. Barriers to progression include afib, RLE cellulitis. Recommendations to address these barriers include monitor VS, monitor labs, monitor I/O's, continuous telemetry monitoring, maintain skin integrity- q2hr turns, promote oral hydration.

## 2025-07-30 NOTE — PROCEDURES
Vascular Access Team Procedure Note     Visit Date: 7/30/2025      Patient Name: John Mora         MRN: [id number]             Procedure:  Single lumen picc placement attempt in Rt basilic vein, able to access vessel without difficulty and thread guidewire, dilated without difficulty but unable to thread catheter completely, met with resistance.  Attempt stopped, pressure applied, no swelling or hematoma noted.   Rt brachial vein accessed without difficulty, guidewire, dilator and catheter all advanced without issue, catheter length 46 cm, arm circumference 35 cm, ext catheter at 1 cm, flushes easily and with positive blood return, CXR ordered to confirm tip location, patient Aflutter rhythm.   Patient tolerated procedure well, curos cap applied, RN aware of placement and CXR order.    CXR confirms tip in svc, will confirm with radiology readings.                     Unsuccessful Insertion  Name of Attempting Clinician: karin MICHELLE  Number of Attempts: 1  Line Type: picc  Attempted Insertion Site (Vein): Right, Basilic vein  Attempted Location: Right, Arm    Karin Dutton RN  7/30/2025  5:18 PM

## 2025-07-30 NOTE — PROGRESS NOTES
07/30/25 1431   Discharge Planning   Expected Discharge Disposition Home H  ([facility] IV infusion)     Per ID, will need Invanz 1 gram IV daily through 8/13.  Will need a PICC line placed. Referral sent to [facility] IV infusion. Father will be the teachable caregiver.  Will need to confirm SOC prior to discharge.     DISCHARGE PLAN IS NOT SECURE. PLEASE NOTIFY CARE TRANSITIONS PRIOR TO DISCHARGE

## 2025-07-30 NOTE — PROGRESS NOTES
Vancomycin Dosing by Pharmacy- Cessation of Therapy    Consult to pharmacy for vancomycin dosing has been discontinued by the prescriber, pharmacy will sign off at this time.    Please call pharmacy if there are further questions or re-enter a consult if vancomycin is resumed.     Russell Mauro, RandaD

## 2025-07-30 NOTE — PROGRESS NOTES
John Mora is a 54 y.o. male on day 3 of admission presenting with Atrial flutter, unspecified type (Multi).    Subjective   Interval History:   Afebrile, no chills  Denies shortness of breath or cough  Denies nausea, vomiting, diarrhea, abdominal pain  Reports interval improvement in right leg erythema     Review of Systems   All other systems reviewed and are negative.      Objective   Range of Vitals (last 24 hours)  Heart Rate:  []   Temp:  [36.3 °C (97.3 °F)-37.3 °C (99.1 °F)]   Resp:  [16-19]   BP: (104-119)/(65-86)   Weight:  [109 kg (241 lb 2.9 oz)]   SpO2:  [93 %-98 %]   Daily Weight  07/30/25 : 109 kg (241 lb 2.9 oz)    Body mass index is 31.82 kg/m².    Physical Exam  Constitutional:       Appearance: Normal appearance.   HENT:      Head: Normocephalic and atraumatic.      Right Ear: External ear normal.      Left Ear: External ear normal.      Nose: Nose normal.      Eyes:      General: No scleral icterus.     Extraocular Movements: Extraocular movements intact.      Conjunctiva/sclera: Conjunctivae normal.         Cardiovascular:      Rate and Rhythm: Regular rhythm. Tachycardia present.      Heart sounds: Normal heart sounds.   Pulmonary:      Effort: Pulmonary effort is normal.      Breath sounds: Normal breath sounds.   Abdominal:      Palpations: Abdomen is soft.      Tenderness: There is no abdominal tenderness.      Musculoskeletal:      Cervical back: Normal range of motion and neck supple.      Right upper leg: Swelling and edema present.      Comments: Right thigh erythema with warmth and tenderness     Feet:      Comments: Bilateral distal hallux stage II ulcers     Skin:     General: Skin is warm and dry.      Comments: Right ischial ulcer with large amount of drainage and tissue      Neurological:      Mental Status: He is alert and oriented to person, place, and time.      Comments: Quadriplegia   Psychiatric:         Behavior: Behavior normal. Behavior is cooperative.       "    Antibiotics  ciprofloxacin - 500 mg  doxycycline - 100 mg  ERTAPENEM IV IN 50 ML NS  meropenem - 1 gram/50 mL  vancomycin - 1.25 gram/250 mL    Relevant Results  Labs  Results from last 72 hours   Lab Units 07/30/25 0437 07/29/25 0613 07/27/25  1729   WBC AUTO x10*3/uL 5.1 5.9 7.3   HEMOGLOBIN g/dL 11.7* 11.9* 12.4*   HEMATOCRIT % 37.3* 35.5* 38.2*   PLATELETS AUTO x10*3/uL 122* 110* 120*   NEUTROS PCT AUTO %  --   --  88.1   LYMPHS PCT AUTO %  --   --  5.5   MONOS PCT AUTO %  --   --  5.3   EOS PCT AUTO %  --   --  0.1     Results from last 72 hours   Lab Units 07/30/25 0437 07/29/25 0613 07/27/25  1729   SODIUM mmol/L 133* 135* 130*   POTASSIUM mmol/L 3.9 3.9 3.7   CHLORIDE mmol/L 102 104 97*   CO2 mmol/L 24 25 25   BUN mg/dL 8 7 12   CREATININE mg/dL 0.56 0.59 0.61   GLUCOSE mg/dL 148* 145* 223*   CALCIUM mg/dL 8.2* 8.1* 8.6   ANION GAP mmol/L 11 10 12   EGFR mL/min/1.73m*2 >90 >90 >90     Results from last 72 hours   Lab Units 07/29/25 0613 07/27/25  1729   ALK PHOS U/L 58 63   BILIRUBIN TOTAL mg/dL 0.7 1.0   PROTEIN TOTAL g/dL 6.2* 6.7   ALT U/L 35 53*   AST U/L 27 52*   ALBUMIN g/dL 3.2* 3.5     Estimated Creatinine Clearance: 125 mL/min (by C-G formula based on SCr of 0.56 mg/dL).  No results found for: \"CRP\"  Microbiology  Susceptibility data from last 14 days.  Collected Specimen Info Organism Amoxicillin/Clavulanate Ampicillin Ampicillin/Sulbactam Aztreonam Cefazolin Cefazolin (uncomplicated UTIs only) Cefepime Cefotaxime Ceftazidime Ceftriaxone Cefuroxime (oral)   07/28/25 Tissue/Biopsy from Wound/Tissue Streptococcus agalactiae (Group B Streptococcus)                Mixed Gram-Positive and Gram-Negative Bacteria              07/27/25 Urine from Clean Catch/Voided Escherichia coli  S  R  S  R  R  R  R  R  R  R  R     Collected Specimen Info Organism Ciprofloxacin Ertapenem Gentamicin Levofloxacin Meropenem Nitrofurantoin Piperacillin/Tazobactam Trimethoprim/Sulfamethoxazole   07/28/25 " Tissue/Biopsy from Wound/Tissue Streptococcus agalactiae (Group B Streptococcus)             Mixed Gram-Positive and Gram-Negative Bacteria           07/27/25 Urine from Clean Catch/Voided Escherichia coli  R  S  S  R  S  S  S  S     Imaging  Transthoracic Echo Complete  Result Date: 7/29/2025           [place] [id number]            Phone [phone] TRANSTHORACIC ECHOCARDIOGRAM REPORT Patient Name:       EMELINA PAZ        Reading Physician:    [id number] Desean Murphy MD Study Date:         7/29/2025            Ordering Provider:    [id number] MARIUSZ RAMIREZ MRN/PID:            [id number]             Fellow: Accession#:         [id number]         Nurse: Date of Birth/Age:  1970 / 54 years Sonographer:          Scott Membreno RDCS Gender Assigned at                      Additional Staff: Birth: Height:             180.34 cm            Admit Date: Weight:             106.59 kg            Admission Status:     Inpatient -                                                                Routine BSA / BMI:          2.26 m2 / 32.78      [facility]                     kg/m2 Blood Pressure: 101 /66 mmHg Study Type:    TRANSTHORACIC ECHO (TTE) COMPLETE Diagnosis/ICD: Abnormal electrocardiogram [ECG] [EKG]-R94.31 Indication:    Abnormal EKG CPT Codes:     Echo Complete w Full [id number] Patient History: Pertinent History: HTN, Hyperlipidemia, LE Edema and Murmur. GERD, Abn Ekg. Study Detail: The following Echo studies were performed: 2D, M-Mode, Doppler and               color flow. Technically challenging study due to poor acoustic               windows, body habitus and patient lying in supine position.               Definity used as a contrast agent for endocardial border               definition. Total contrast used for this  procedure was 2 mL via IV               push.  PHYSICIAN INTERPRETATION: Left Ventricle: Left ventricular ejection fraction is normal by visual estimate at 55-60%. The patient is in atrial fibrillation/flutter which may influence the estimate of left ventricular function and transvalvular flows. There are no regional wall motion abnormalities. The left ventricular cavity size is normal. There is mild increased septal and mildly increased posterior left ventricular wall thickness. There is left ventricular concentric remodeling. Left ventricular diastolic filling was indeterminate due to atrial fibrillation/flutter. Left Atrium: The left atrial size is normal. Right Ventricle: The right ventricle is normal in size. There is normal right ventricular global systolic function. Right Atrium: The right atrial size is normal. Aortic Valve: The aortic valve is trileaflet. The aortic valve area by VTI is 3.18 cmï¿½ with a peak velocity of 1.15 m/s. The peak and mean gradients are 5 mmHg and 3 mmHg, respectively, with a dimensionless index of 0.96. There is no evidence of aortic valve regurgitation. Mitral Valve: The mitral valve is normal in structure. The doppler estimated peak and mean diastolic gradients are 7 mmHg and 1 mmHg, respectively. There is trace mitral valve regurgitation. The E Vmax is 0.68 m/s. Tricuspid Valve: The tricuspid valve is structurally normal. There is trace tricuspid regurgitation. The right ventricular systolic pressure could not be estimated. Pulmonic Valve: The pulmonic valve is structurally normal. There is physiologic pulmonic valve regurgitation. Pericardium: No pericardial effusion noted. Aorta: The aortic root is normal.  CONCLUSIONS:  1. Left ventricular ejection fraction is normal by visual estimate at 55-60%.  2. Poorly visualized anatomical structures due to suboptimal image quality.  3. Left ventricular diastolic filling was indeterminate due to atrial fibrillation/flutter.  4. There  is normal right ventricular global systolic function.  5. Trace mitral valve regurgitation.  6. Trace tricuspid regurgitation is visualized.  7. The patient is in atrial fibrillation/flutter which may influence the estimate of left ventricular function and transvalvular flows. QUANTITATIVE DATA SUMMARY:  2D MEASUREMENTS:             Normal Ranges: Ao Root s:       3.28 cm IVSd:            1.19 cm     (0.6-1.1cm) LVPWd:           1.23 cm     (0.6-1.1cm) LVIDd:           4.35 cm     (3.9-5.9cm) LVIDs:           2.79 cm LV Mass Index:   84.0 g/m2 LVEDV Index:     53.74 ml/m2 LV % FS          35.9 %  LEFT ATRIUM:                 Normal Ranges: LA Vol A4C:       64.4 ml LA Vol A2C:       45.0 ml LA Vol Index BSA: 24.2 ml/m2  LV SYSTOLIC FUNCTION:                      Normal Ranges: EF-A4C View:    51 % (>=55%) EF-A2C View:    62 % EF-Biplane:     57 % EF-Visual:      58 % LV EF Reported: 58 %  LV DIASTOLIC FUNCTION:          Normal Ranges: MV Peak E:             0.68 m/s (0.7-1.2 m/s) MV Peak A:             0.34 m/s (0.42-0.7 m/s) E/A Ratio:             2.00     (1.0-2.2)  MITRAL VALVE:          Normal Ranges: MV Vmax:      1.28 m/s (<=1.3m/s) MV peak P.6 mmHg (<5mmHg) MV mean P.5 mmHg (<48mmHg) MV DT:        118 msec (150-240msec)  AORTIC VALVE:                     Normal Ranges: AoV Vmax:                1.15 m/s (<=1.7m/s) AoV Peak P.3 mmHg (<20mmHg) AoV Mean P.7 mmHg (1.7-11.5mmHg) LVOT Max Frantz:            0.90 m/s (<=1.1m/s) AoV VTI:                 16.87 cm (18-25cm) LVOT VTI:                16.17 cm LVOT Diameter:           2.05 cm  (1.8-2.4cm) AoV Area, VTI:           3.18 cm2 (2.5-5.5cm2) AoV Area,Vmax:           2.60 cm2 (2.5-4.5cm2) AoV Dimensionless Index: 0.96  PULMONIC VALVE:          Normal Ranges: PV Max Frantz:     0.9 m/s  (0.6-0.9m/s) PV Max PG:      3.3 mmHg  [id number] Desean Murphy MD Electronically signed on 2025 at 11:53:00 AM  ** Final **     ECG 12  lead  Result Date: 7/29/2025  Atrial flutter with variable AV block Nonspecific intraventricular block Abnormal ECG When compared with ECG of 11-APR-1993 16:10, Atrial flutter has replaced Sinus rhythm Vent. rate has increased BY  45 BPM Nonspecific T wave abnormality now evident in Inferior leads QT has lengthened Confirmed by Mateo Mercado ([id number]) on 7/29/2025 8:22:25 AM    Lower extremity venous duplex right  Result Date: 7/27/2025  Interpreted By:  Hieu Ames, STUDY: [facility] LOWER EXTREMITY VENOUS DUPLEX RIGHT;  7/27/2025 6:52 pm   INDICATION: Signs/Symptoms:Atraumatic pain and swelling.   COMPARISON: None.   ACCESSION NUMBER(S): [id number]   ORDERING CLINICIAN: LESLEY LIVINGSTON   TECHNIQUE: Vascular ultrasound of the right lower extremity was performed. Real-time compression views as well as Gray scale, color Doppler and spectral Doppler waveform analysis was performed.   FINDINGS: Evaluation of the visualized portions of the right common femoral vein, proximal, mid, and distal femoral vein, and popliteal vein were performed.  Evaluation of the visualized portions of the  posterior tibial and peroneal veins were also performed.   Limitations:  Limited evaluation the calf veins.   The evaluated veins demonstrate normal compressibility. There is intact venous flow demonstrating normal respiratory variability and normal augmentation of flow with calf compression. Therefore, there is no ultrasonographic evidence for deep vein thrombosis within the evaluated veins.       No sonographic evidence for deep vein thrombosis within the evaluated veins of the right lower extremity.   MACRO: None   Signed by: Hieu Ames 7/27/2025 7:30 PM Dictation workstation:   [id number]    XR chest 1 view  Result Date: 7/27/2025  Interpreted By:  Reanna Verma, STUDY: Chest, single AP view.   INDICATION: Signs/Symptoms:Malaise, new onset atrial flutter.   COMPARISON: None.   ACCESSION NUMBER(S): [id number]   ORDERING CLINICIAN:  LESLEY LIVINGSTON   FINDINGS: The cardiac silhouette size is within normal limits. There is no focal consolidation, edema or pneumothorax. Questionable trace right pleural effusion with blunting of the costophrenic angle. No acute osseous abnormality.       1. Questionable trace right pleural effusion.   MACRO: None.   Signed by: Reanna Verma 7/27/2025 6:42 PM Dictation workstation:   [id number]    XR femur right 2+ views  Result Date: 7/27/2025  Interpreted By:  Reanna Verma, STUDY: Right femur, two views.   INDICATION: Signs/Symptoms:Atraumatic pain and swelling.   COMPARISON: CT pelvis 01/03/2023.   ACCESSION NUMBER(S): [id number]   ORDERING CLINICIAN: LESLEY LIVINGSTON   FINDINGS: No acute fracture or malalignment. Severe right hip osteoarthrosis with joint space loss and osteophytes. Moderate to severe right knee osteoarthrosis as well. Bones appear demineralized. Extensive calcific deposits are visualized in the lateral aspect of the femoral neck and inferior aspect of the ischial tuberosity likely representing hydroxyapatite deposition, also noted on previous CT.       1. Severe right hip and moderate to severe right knee osteoarthrosis. 2. Extensive calcific deposits are visualized in the lateral aspect of the femoral neck and inferior aspect of the ischial tuberosity likely representing hydroxyapatite deposition, also noted on previous CT. 3. No acute fracture or malalignment.   MACRO:   None.   Signed by: Reanna Verma 7/27/2025 6:41 PM Dictation workstation:   [id number]     Assessment/Plan   Right thigh cellulitis  Resolved lactic acidosis  History of MRSA  E. coli ESBL urinary tract infection     Discontinue vancomycin  Discontinue Zosyn  Begin IV meropenem-coverage of group B streptococcus in wound and EBSL ecoli in urine  Follow-up blood cultures  Local care right thigh ulcer  Offloading  Supportive care  Monitor temperature and WBC  PICC line placement  Discharge plan is IV invanz 1 gm daily  through 8/13/25 for a total of 14 days, CBC with diff and CMP weekly, routine line care per protocol.  See discharge rec, notified case management.    Discussed with Dr. Rosales     This is a complex infectious disease issue and the following was performed today (for more details please see the above note): Management decisions reflecting the added complexity (e.g., changes in antimicrobial therapy, infection control strategies).     Sharon Jordan, APRN-CNP

## 2025-07-30 NOTE — PROGRESS NOTES
"John Mora is a 54 y.o. male on day 3 of admission presenting with Atrial flutter, unspecified type (Multi).    Subjective   Patient is doing better today       Objective     Physical Exam  Vitals reviewed.   Constitutional:       Appearance: Normal appearance.   HENT:      Head: Normocephalic and atraumatic.      Right Ear: Tympanic membrane, ear canal and external ear normal.      Left Ear: Tympanic membrane, ear canal and external ear normal.      Nose: Nose normal.      Mouth/Throat:      Pharynx: Oropharynx is clear.     Eyes:      Extraocular Movements: Extraocular movements intact.      Conjunctiva/sclera: Conjunctivae normal.      Pupils: Pupils are equal, round, and reactive to light.       Cardiovascular:      Rate and Rhythm: Normal rate and regular rhythm.      Pulses: Normal pulses.      Heart sounds: Normal heart sounds.   Pulmonary:      Effort: Pulmonary effort is normal.      Breath sounds: Normal breath sounds.   Abdominal:      General: Abdomen is flat. Bowel sounds are normal.      Palpations: Abdomen is soft.     Musculoskeletal:      Cervical back: Normal range of motion and neck supple.     Skin:     General: Skin is warm and dry.      Findings: Erythema present.      Comments: Improving right leg erythema  extending to the lower leg     Neurological:      Mental Status: He is alert and oriented to person, place, and time. Mental status is at baseline.      Motor: Weakness present.      Coordination: Coordination abnormal.      Gait: Gait abnormal.      Comments: Quadriplegic   Psychiatric:         Mood and Affect: Mood normal.         Last Recorded Vitals  Blood pressure 104/65, pulse 98, temperature 36.7 °C (98.1 °F), temperature source Temporal, resp. rate 19, height 1.854 m (6' 1\"), weight 109 kg (241 lb 2.9 oz), SpO2 97%.  Intake/Output last 3 Shifts:  I/O last 3 completed shifts:  In: 1758.3 (16.1 mL/kg) [P.O.:200; I.V.:1558.3 (14.2 mL/kg)]  Out: 6900 (63.1 mL/kg) [Urine:6900 (1.8 " mL/kg/hr)]  Weight: 109.4 kg     Relevant Results             Results for orders placed or performed during the hospital encounter of 07/27/25 (from the past 24 hours)   POCT GLUCOSE   Result Value Ref Range    POCT Glucose 154 (H) 74 - 99 mg/dL   POCT GLUCOSE   Result Value Ref Range    POCT Glucose 182 (H) 74 - 99 mg/dL   CBC   Result Value Ref Range    WBC 5.1 4.4 - 11.3 x10*3/uL    nRBC 0.0 0.0 - 0.0 /100 WBCs    RBC 4.18 (L) 4.50 - 5.90 x10*6/uL    Hemoglobin 11.7 (L) 13.5 - 17.5 g/dL    Hematocrit 37.3 (L) 41.0 - 52.0 %    MCV 89 80 - 100 fL    MCH 28.0 26.0 - 34.0 pg    MCHC 31.4 (L) 32.0 - 36.0 g/dL    RDW 17.4 (H) 11.5 - 14.5 %    Platelets 122 (L) 150 - 450 x10*3/uL   Basic Metabolic Panel   Result Value Ref Range    Glucose 148 (H) 74 - 99 mg/dL    Sodium 133 (L) 136 - 145 mmol/L    Potassium 3.9 3.5 - 5.3 mmol/L    Chloride 102 98 - 107 mmol/L    Bicarbonate 24 21 - 32 mmol/L    Anion Gap 11 10 - 20 mmol/L    Urea Nitrogen 8 6 - 23 mg/dL    Creatinine 0.56 0.50 - 1.30 mg/dL    eGFR >90 >60 mL/min/1.73m*2    Calcium 8.2 (L) 8.6 - 10.3 mg/dL   POCT GLUCOSE   Result Value Ref Range    POCT Glucose 133 (H) 74 - 99 mg/dL   POCT GLUCOSE   Result Value Ref Range    POCT Glucose 165 (H) 74 - 99 mg/dL                    Assessment & Plan  Atrial flutter, unspecified type (Multi)    Urinary tract infection    Cellulitis of right lower extremity    Quadriplegia, C1-C4, complete (Multi)    Muscle spasticity    GERD (gastroesophageal reflux disease)    Bilateral great toes ulcers (Multi)    Neurogenic bladder    Neurogenic bowel    History of aplastic anemia    Pressure ulcer    Urine culture growing E.coli ESBL   Wound culture group B streptococcus  continue Zosyn and vancomycin  Cellulitis is showing a lot of improvement today   atrial flutter related to autonomic dysfunction  EP cardiology input noted  Normal echo  air mattress   Wound care for toe wound and hip wound  Monitor blood sugars  Sliding scale  insulin  Blood sugars are coming down.  A1c 5.9  High sugar probably from infection  Patient is going to have his stool regimen tonight by his father      I spent  minutes in the professional and overall care of this patient.      Marta Aldridge MD

## 2025-07-31 ENCOUNTER — DOCUMENTATION (OUTPATIENT)
Dept: HOME HEALTH SERVICES | Facility: HOME HEALTH | Age: 55
End: 2025-07-31
Payer: COMMERCIAL

## 2025-07-31 ENCOUNTER — HOME INFUSION (OUTPATIENT)
Dept: INFUSION THERAPY | Age: 55
End: 2025-07-31
Payer: COMMERCIAL

## 2025-07-31 VITALS
HEART RATE: 107 BPM | OXYGEN SATURATION: 94 % | RESPIRATION RATE: 18 BRPM | WEIGHT: 241.18 LBS | SYSTOLIC BLOOD PRESSURE: 122 MMHG | DIASTOLIC BLOOD PRESSURE: 88 MMHG | BODY MASS INDEX: 31.96 KG/M2 | TEMPERATURE: 98.2 F | HEIGHT: 73 IN

## 2025-07-31 LAB
ANION GAP SERPL CALCULATED.3IONS-SCNC: 11 MMOL/L (ref 10–20)
BUN SERPL-MCNC: 8 MG/DL (ref 6–23)
CALCIUM SERPL-MCNC: 8.6 MG/DL (ref 8.6–10.3)
CHLORIDE SERPL-SCNC: 101 MMOL/L (ref 98–107)
CO2 SERPL-SCNC: 26 MMOL/L (ref 21–32)
CREAT SERPL-MCNC: 0.52 MG/DL (ref 0.5–1.3)
EGFRCR SERPLBLD CKD-EPI 2021: >90 ML/MIN/1.73M*2
ERYTHROCYTE [DISTWIDTH] IN BLOOD BY AUTOMATED COUNT: 17.2 % (ref 11.5–14.5)
GLUCOSE BLD MANUAL STRIP-MCNC: 129 MG/DL (ref 74–99)
GLUCOSE BLD MANUAL STRIP-MCNC: 156 MG/DL (ref 74–99)
GLUCOSE SERPL-MCNC: 114 MG/DL (ref 74–99)
HCT VFR BLD AUTO: 36.2 % (ref 41–52)
HGB BLD-MCNC: 11.9 G/DL (ref 13.5–17.5)
MCH RBC QN AUTO: 28.1 PG (ref 26–34)
MCHC RBC AUTO-ENTMCNC: 32.9 G/DL (ref 32–36)
MCV RBC AUTO: 86 FL (ref 80–100)
NRBC BLD-RTO: 0 /100 WBCS (ref 0–0)
PLATELET # BLD AUTO: 153 X10*3/UL (ref 150–450)
POTASSIUM SERPL-SCNC: 3.9 MMOL/L (ref 3.5–5.3)
RBC # BLD AUTO: 4.23 X10*6/UL (ref 4.5–5.9)
SODIUM SERPL-SCNC: 134 MMOL/L (ref 136–145)
WBC # BLD AUTO: 4.2 X10*3/UL (ref 4.4–11.3)

## 2025-07-31 PROCEDURE — 2500000002 HC RX 250 W HCPCS SELF ADMINISTERED DRUGS (ALT 637 FOR MEDICARE OP, ALT 636 FOR OP/ED): Performed by: INTERNAL MEDICINE

## 2025-07-31 PROCEDURE — 2500000004 HC RX 250 GENERAL PHARMACY W/ HCPCS (ALT 636 FOR OP/ED): Performed by: INTERNAL MEDICINE

## 2025-07-31 PROCEDURE — 85027 COMPLETE CBC AUTOMATED: CPT | Performed by: INTERNAL MEDICINE

## 2025-07-31 PROCEDURE — 87081 CULTURE SCREEN ONLY: CPT | Mod: WESLAB | Performed by: NURSE PRACTITIONER

## 2025-07-31 PROCEDURE — 80048 BASIC METABOLIC PNL TOTAL CA: CPT | Performed by: INTERNAL MEDICINE

## 2025-07-31 PROCEDURE — 2500000001 HC RX 250 WO HCPCS SELF ADMINISTERED DRUGS (ALT 637 FOR MEDICARE OP)

## 2025-07-31 PROCEDURE — 2500000001 HC RX 250 WO HCPCS SELF ADMINISTERED DRUGS (ALT 637 FOR MEDICARE OP): Performed by: INTERNAL MEDICINE

## 2025-07-31 PROCEDURE — 82947 ASSAY GLUCOSE BLOOD QUANT: CPT

## 2025-07-31 PROCEDURE — 99239 HOSP IP/OBS DSCHRG MGMT >30: CPT | Performed by: INTERNAL MEDICINE

## 2025-07-31 RX ADMIN — OXYBUTYNIN CHLORIDE 5 MG: 5 TABLET ORAL at 08:59

## 2025-07-31 RX ADMIN — ERTAPENEM SODIUM 1 G: 1 INJECTION, POWDER, LYOPHILIZED, FOR SOLUTION INTRAMUSCULAR; INTRAVENOUS at 16:25

## 2025-07-31 RX ADMIN — INSULIN LISPRO 1 UNITS: 100 INJECTION, SOLUTION INTRAVENOUS; SUBCUTANEOUS at 12:46

## 2025-07-31 RX ADMIN — PANTOPRAZOLE SODIUM 40 MG: 40 TABLET, DELAYED RELEASE ORAL at 06:26

## 2025-07-31 RX ADMIN — ENOXAPARIN SODIUM 40 MG: 100 INJECTION SUBCUTANEOUS at 08:59

## 2025-07-31 RX ADMIN — OXYBUTYNIN CHLORIDE 5 MG: 5 TABLET ORAL at 16:09

## 2025-07-31 RX ADMIN — BACLOFEN 20 MG: 10 TABLET ORAL at 08:59

## 2025-07-31 RX ADMIN — ASPIRIN 81 MG: 81 TABLET, DELAYED RELEASE ORAL at 08:59

## 2025-07-31 ASSESSMENT — COGNITIVE AND FUNCTIONAL STATUS - GENERAL
MOVING TO AND FROM BED TO CHAIR: TOTAL
TURNING FROM BACK TO SIDE WHILE IN FLAT BAD: TOTAL
HELP NEEDED FOR BATHING: TOTAL
EATING MEALS: TOTAL
MOBILITY SCORE: 6
TOILETING: TOTAL
PERSONAL GROOMING: TOTAL
DAILY ACTIVITIY SCORE: 6
MOVING FROM LYING ON BACK TO SITTING ON SIDE OF FLAT BED WITH BEDRAILS: TOTAL
STANDING UP FROM CHAIR USING ARMS: TOTAL
WALKING IN HOSPITAL ROOM: TOTAL
CLIMB 3 TO 5 STEPS WITH RAILING: TOTAL
DRESSING REGULAR LOWER BODY CLOTHING: TOTAL
DRESSING REGULAR UPPER BODY CLOTHING: TOTAL

## 2025-07-31 ASSESSMENT — PAIN - FUNCTIONAL ASSESSMENT: PAIN_FUNCTIONAL_ASSESSMENT: 0-10

## 2025-07-31 ASSESSMENT — PAIN SCALES - GENERAL: PAINLEVEL_OUTOF10: 0 - NO PAIN

## 2025-07-31 NOTE — PROGRESS NOTES
Patient with an active discharge. Patient will return home with family and [facility]. Start of care tomorrow for IV antibiotics and wound care. Will follow as needed.     07/31/25 1531   Discharge Planning   Home or Post Acute Services In home services   Type of Home Care Services Home nursing visits   Expected Discharge Disposition Home H  ([facility])   Does the patient need discharge transport arranged? Yes   [facility] coordination needed? Yes

## 2025-07-31 NOTE — NURSING NOTE
Patient awake. Repositioned in bed. Morning medication given. Labs drawn and sent. Provided fresh ice water. Patient has no furhter needs at this time.

## 2025-07-31 NOTE — PROGRESS NOTES
John Mora is a 54 y.o. male on day 4 of admission presenting with Atrial flutter, unspecified type (Multi).    Subjective   Interval History:   Afebrile, no chills  No chest pain no shortness of breath  No nausea vomiting or diarrhea    Review of Systems   All other systems reviewed and are negative.      Objective   Range of Vitals (last 24 hours)  Heart Rate:  []   Temp:  [36.4 °C (97.5 °F)-36.9 °C (98.4 °F)]   Resp:  [17-19]   BP: ()/(46-91)   SpO2:  [93 %-97 %]   Daily Weight  07/30/25 : 109 kg (241 lb 2.9 oz)    Body mass index is 31.82 kg/m².    Physical Exam  Constitutional:       Appearance: Normal appearance.   HENT:      Head: Normocephalic and atraumatic.      Right Ear: External ear normal.      Left Ear: External ear normal.      Nose: Nose normal.      Eyes:      General: No scleral icterus.     Extraocular Movements: Extraocular movements intact.      Conjunctiva/sclera: Conjunctivae normal.         Cardiovascular:      Rate and Rhythm: Regular rhythm. Tachycardia present.      Heart sounds: Normal heart sounds.   Pulmonary:      Effort: Pulmonary effort is normal.      Breath sounds: Normal breath sounds.   Abdominal:      Palpations: Abdomen is soft.      Tenderness: There is no abdominal tenderness.      Musculoskeletal:      Cervical back: Normal range of motion and neck supple.      Right upper leg: Swelling and edema present.      Comments: Right thigh erythema with warmth and tenderness     Feet:      Comments: Bilateral distal hallux stage II ulcers     Skin:     General: Skin is warm and dry.      Comments: Right ischial ulcer with large amount of drainage and tissue      Neurological:      Mental Status: He is alert and oriented to person, place, and time.      Comments: Quadriplegia   Psychiatric:         Behavior: Behavior normal. Behavior is cooperative.     Antibiotics  ciprofloxacin - 500 mg  doxycycline - 100 mg  ertapenem - 1 gram/50 mL  ERTAPENEM IV IN 50 ML  "NS    Relevant Results  Labs  Results from last 72 hours   Lab Units 07/31/25  0641 07/30/25  0437 07/29/25  0613   WBC AUTO x10*3/uL 4.2* 5.1 5.9   HEMOGLOBIN g/dL 11.9* 11.7* 11.9*   HEMATOCRIT % 36.2* 37.3* 35.5*   PLATELETS AUTO x10*3/uL 153 122* 110*     Results from last 72 hours   Lab Units 07/31/25  0641 07/30/25  0437 07/29/25  0613   SODIUM mmol/L 134* 133* 135*   POTASSIUM mmol/L 3.9 3.9 3.9   CHLORIDE mmol/L 101 102 104   CO2 mmol/L 26 24 25   BUN mg/dL 8 8 7   CREATININE mg/dL 0.52 0.56 0.59   GLUCOSE mg/dL 114* 148* 145*   CALCIUM mg/dL 8.6 8.2* 8.1*   ANION GAP mmol/L 11 11 10   EGFR mL/min/1.73m*2 >90 >90 >90     Results from last 72 hours   Lab Units 07/29/25  0613   ALK PHOS U/L 58   BILIRUBIN TOTAL mg/dL 0.7   PROTEIN TOTAL g/dL 6.2*   ALT U/L 35   AST U/L 27   ALBUMIN g/dL 3.2*     Estimated Creatinine Clearance: 125 mL/min (by C-G formula based on SCr of 0.52 mg/dL).  No results found for: \"CRP\"  Microbiology  Susceptibility data from last 14 days.  Collected Specimen Info Organism Amoxicillin/Clavulanate Ampicillin Ampicillin/Sulbactam Aztreonam Cefazolin Cefazolin (uncomplicated UTIs only) Cefepime Cefotaxime Ceftazidime Ceftriaxone Cefuroxime (oral)   07/28/25 Tissue/Biopsy from Wound/Tissue Streptococcus agalactiae (Group B Streptococcus)                Mixed Gram-Positive and Gram-Negative Bacteria              07/27/25 Urine from Clean Catch/Voided Escherichia coli  S  R  S  R  R  R  R  R  R  R  R     Collected Specimen Info Organism Ciprofloxacin Ertapenem Gentamicin Levofloxacin Meropenem Nitrofurantoin Piperacillin/Tazobactam Trimethoprim/Sulfamethoxazole   07/28/25 Tissue/Biopsy from Wound/Tissue Streptococcus agalactiae (Group B Streptococcus)             Mixed Gram-Positive and Gram-Negative Bacteria           07/27/25 Urine from Clean Catch/Voided Escherichia coli  R  S  S  R  S  S  S  S       Imaging  XR chest 1 view  Result Date: 7/30/2025  Interpreted By:  Brandon Haddad, STUDY: " XR CHEST 1 VIEW;  7/30/2025 5:04 pm   INDICATION: Signs/Symptoms:PICC placement.     COMPARISON: 07/27/2025   ACCESSION NUMBER(S): [id number]   ORDERING CLINICIAN: AYANA ESPINOZA   FINDINGS: AP radiograph of the chest was provided.   Right-sided PICC tip projects over the cavoatrial junction.   CARDIOMEDIASTINAL SILHOUETTE: Cardiomediastinal silhouette is normal in size and configuration.   LUNGS: Trace right basilar opacity suggesting trace pleural effusion and atelectasis. This is similar in appearance to the prior. No pneumothorax. The left lung is clear.   ABDOMEN: No remarkable upper abdominal findings.   BONES: No acute osseous changes.       1.  Right PICC tip projects over the cavoatrial junction. 2. Small right basilar pleural effusion and atelectasis, similar to prior.       MACRO: None   Signed by: Brandon Haddad 7/30/2025 6:01 PM Dictation workstation:   [id number]    Bedside PICC Imaging  Result Date: 7/30/2025  These images are not reportable by radiology and will not be interpreted by  Radiologists.    Transthoracic Echo Complete  Result Date: 7/29/2025           [place] [address]            Phone [phone] TRANSTHORACIC ECHOCARDIOGRAM REPORT Patient Name:       EMELINA Watters Physician:    [id number] Desean Murphy MD Study Date:         7/29/2025            Ordering Provider:    [id number] MARIUSZ RAMIREZ MRN/PID:            [id number]             Fellow: Accession#:         [id number]         Nurse: Date of Birth/Age:  1970 / 54 years Sonographer:          Scott Membreno RDCS Gender Assigned at                      Additional Staff: Birth: Height:             180.34 cm            Admit Date: Weight:             106.59 kg            Admission Status:     Inpatient -                                                                 Routine BSA / BMI:          2.26 m2 / 32.78      [facility]                     kg/m2 Blood Pressure: 101 /66 mmHg Study Type:    TRANSTHORACIC ECHO (TTE) COMPLETE Diagnosis/ICD: Abnormal electrocardiogram [ECG] [EKG]-R94.31 Indication:    Abnormal EKG CPT Codes:     Echo Complete w Full Doppler-[id number] Patient History: Pertinent History: HTN, Hyperlipidemia, LE Edema and Murmur. GERD, Abn Ekg. Study Detail: The following Echo studies were performed: 2D, M-Mode, Doppler and               color flow. Technically challenging study due to poor acoustic               windows, body habitus and patient lying in supine position.               Definity used as a contrast agent for endocardial border               definition. Total contrast used for this procedure was 2 mL via IV               push.  PHYSICIAN INTERPRETATION: Left Ventricle: Left ventricular ejection fraction is normal by visual estimate at 55-60%. The patient is in atrial fibrillation/flutter which may influence the estimate of left ventricular function and transvalvular flows. There are no regional wall motion abnormalities. The left ventricular cavity size is normal. There is mild increased septal and mildly increased posterior left ventricular wall thickness. There is left ventricular concentric remodeling. Left ventricular diastolic filling was indeterminate due to atrial fibrillation/flutter. Left Atrium: The left atrial size is normal. Right Ventricle: The right ventricle is normal in size. There is normal right ventricular global systolic function. Right Atrium: The right atrial size is normal. Aortic Valve: The aortic valve is trileaflet. The aortic valve area by VTI is 3.18 cmï¿½ with a peak velocity of 1.15 m/s. The peak and mean gradients are 5 mmHg and 3 mmHg, respectively, with a dimensionless index of 0.96. There is no evidence of aortic valve regurgitation. Mitral Valve: The mitral valve is normal in  structure. The doppler estimated peak and mean diastolic gradients are 7 mmHg and 1 mmHg, respectively. There is trace mitral valve regurgitation. The E Vmax is 0.68 m/s. Tricuspid Valve: The tricuspid valve is structurally normal. There is trace tricuspid regurgitation. The right ventricular systolic pressure could not be estimated. Pulmonic Valve: The pulmonic valve is structurally normal. There is physiologic pulmonic valve regurgitation. Pericardium: No pericardial effusion noted. Aorta: The aortic root is normal.  CONCLUSIONS:  1. Left ventricular ejection fraction is normal by visual estimate at 55-60%.  2. Poorly visualized anatomical structures due to suboptimal image quality.  3. Left ventricular diastolic filling was indeterminate due to atrial fibrillation/flutter.  4. There is normal right ventricular global systolic function.  5. Trace mitral valve regurgitation.  6. Trace tricuspid regurgitation is visualized.  7. The patient is in atrial fibrillation/flutter which may influence the estimate of left ventricular function and transvalvular flows. QUANTITATIVE DATA SUMMARY:  2D MEASUREMENTS:             Normal Ranges: Ao Root s:       3.28 cm IVSd:            1.19 cm     (0.6-1.1cm) LVPWd:           1.23 cm     (0.6-1.1cm) LVIDd:           4.35 cm     (3.9-5.9cm) LVIDs:           2.79 cm LV Mass Index:   84.0 g/m2 LVEDV Index:     53.74 ml/m2 LV % FS          35.9 %  LEFT ATRIUM:                 Normal Ranges: LA Vol A4C:       64.4 ml LA Vol A2C:       45.0 ml LA Vol Index BSA: 24.2 ml/m2  LV SYSTOLIC FUNCTION:                      Normal Ranges: EF-A4C View:    51 % (>=55%) EF-A2C View:    62 % EF-Biplane:     57 % EF-Visual:      58 % LV EF Reported: 58 %  LV DIASTOLIC FUNCTION:          Normal Ranges: MV Peak E:             0.68 m/s (0.7-1.2 m/s) MV Peak A:             0.34 m/s (0.42-0.7 m/s) E/A Ratio:             2.00     (1.0-2.2)  MITRAL VALVE:          Normal Ranges: MV Vmax:      1.28 m/s  (<=1.3m/s) MV peak P.6 mmHg (<5mmHg) MV mean P.5 mmHg (<48mmHg) MV DT:        118 msec (150-240msec)  AORTIC VALVE:                     Normal Ranges: AoV Vmax:                1.15 m/s (<=1.7m/s) AoV Peak P.3 mmHg (<20mmHg) AoV Mean P.7 mmHg (1.7-11.5mmHg) LVOT Max Frantz:            0.90 m/s (<=1.1m/s) AoV VTI:                 16.87 cm (18-25cm) LVOT VTI:                16.17 cm LVOT Diameter:           2.05 cm  (1.8-2.4cm) AoV Area, VTI:           3.18 cm2 (2.5-5.5cm2) AoV Area,Vmax:           2.60 cm2 (2.5-4.5cm2) AoV Dimensionless Index: 0.96  PULMONIC VALVE:          Normal Ranges: PV Max Frantz:     0.9 m/s  (0.6-0.9m/s) PV Max PG:      3.3 mmHg  [id number] Desean Murphy MD Electronically signed on 2025 at 11:53:00 AM  ** Final **     ECG 12 lead  Result Date: 2025  Atrial flutter with variable AV block Nonspecific intraventricular block Abnormal ECG When compared with ECG of 1993 16:10, Atrial flutter has replaced Sinus rhythm Vent. rate has increased BY  45 BPM Nonspecific T wave abnormality now evident in Inferior leads QT has lengthened Confirmed by Mateo Mercado ([id number]) on 2025 8:22:25 AM    Lower extremity venous duplex right  Result Date: 2025  Interpreted By:  Hieu Ames, STUDY: [facility] LOWER EXTREMITY VENOUS DUPLEX RIGHT;  2025 6:52 pm   INDICATION: Signs/Symptoms:Atraumatic pain and swelling.   COMPARISON: None.   ACCESSION NUMBER(S): [id number]   ORDERING CLINICIAN: LESLEY LIVINGSTON   TECHNIQUE: Vascular ultrasound of the right lower extremity was performed. Real-time compression views as well as Gray scale, color Doppler and spectral Doppler waveform analysis was performed.   FINDINGS: Evaluation of the visualized portions of the right common femoral vein, proximal, mid, and distal femoral vein, and popliteal vein were performed.  Evaluation of the visualized portions of the  posterior tibial and peroneal veins were also performed.    Limitations:  Limited evaluation the calf veins.   The evaluated veins demonstrate normal compressibility. There is intact venous flow demonstrating normal respiratory variability and normal augmentation of flow with calf compression. Therefore, there is no ultrasonographic evidence for deep vein thrombosis within the evaluated veins.       No sonographic evidence for deep vein thrombosis within the evaluated veins of the right lower extremity.   MACRO: None   Signed by: Hieu Ames 7/27/2025 7:30 PM Dictation workstation:   [id number]    XR chest 1 view  Result Date: 7/27/2025  Interpreted By:  Reanna Verma, STUDY: Chest, single AP view.   INDICATION: Signs/Symptoms:Malaise, new onset atrial flutter.   COMPARISON: None.   ACCESSION NUMBER(S): [id number]   ORDERING CLINICIAN: LESLEY LIVINGSTON   FINDINGS: The cardiac silhouette size is within normal limits. There is no focal consolidation, edema or pneumothorax. Questionable trace right pleural effusion with blunting of the costophrenic angle. No acute osseous abnormality.       1. Questionable trace right pleural effusion.   MACRO: None.   Signed by: Reanna Verma 7/27/2025 6:42 PM Dictation workstation:   [id number]    XR femur right 2+ views  Result Date: 7/27/2025  Interpreted By:  Reanna Verma, STUDY: Right femur, two views.   INDICATION: Signs/Symptoms:Atraumatic pain and swelling.   COMPARISON: CT pelvis 01/03/2023.   ACCESSION NUMBER(S): [id number]   ORDERING CLINICIAN: LESLEY LIVINGSTON   FINDINGS: No acute fracture or malalignment. Severe right hip osteoarthrosis with joint space loss and osteophytes. Moderate to severe right knee osteoarthrosis as well. Bones appear demineralized. Extensive calcific deposits are visualized in the lateral aspect of the femoral neck and inferior aspect of the ischial tuberosity likely representing hydroxyapatite deposition, also noted on previous CT.       1. Severe right hip and moderate to severe right knee  osteoarthrosis. 2. Extensive calcific deposits are visualized in the lateral aspect of the femoral neck and inferior aspect of the ischial tuberosity likely representing hydroxyapatite deposition, also noted on previous CT. 3. No acute fracture or malalignment.   MACRO:   None.   Signed by: Reanna Verma 7/27/2025 6:41 PM Dictation workstation:   [id number]      Assessment/Plan   Right thigh cellulitis-positive wound culture for group B streptococcus, resolving  Resolved lactic acidosis  History of MRSA  E. coli ESBL urinary tract infection     Discontinue meropenem  IV Invanz-coverage for urinary tract infection/cellulitis  Follow-up blood cultures  Local care right thigh ulcer  Offloading  Supportive care  Monitor temperature and WBC  PICC line placement  Discharge plan is IV invanz 1 gm daily through 8/13/25 for a total of 14 days, CBC with diff and CMP weekly, routine line care per protocol.  See discharge rec, notified case management.        This is a complex infectious disease issue and the following was performed today (for more details please see the above note): Management decisions reflecting the added complexity (e.g., changes in antimicrobial therapy, infection control strategies).     Alexander Rosales MD

## 2025-07-31 NOTE — PROGRESS NOTES
Reviewed pt info as correct, care plan done today  Diagnosis: right thigh cellulitis / ESBL UTI  PMH: quadriplegia, hx of MRSA, hx of chronic ischial tuberosity pressure sore  Allergies reviewed  Allergies[1]   Review of labs at discharge  Line info: pt has a SL PICC line to be managed per [facility] protocol  Pt ordered IV ertapenem 1g q24h through 8/13/25, med placed in minibag plus  Followed by Dr. Rosales  Labs ordered include CBC/diff, CMP  John Mora is a 54 y.o. male discharged from hospital to [facility] for skilled nursing and pharmacy services. ADOD 7/31 SOC 8/1    Spoke at length with patient's father Hari (primary caregiver). Verified correct address and phone #. Reviewed [facility] services and answered all questions. RN to call pt with time of appt. Pt agreed to delivery by 6-9pm this evening and confirmed will refrigerate med as appropriate.   to call father [phone] before delivery. [facility] offered to  - pt stated no questions at this time.  Confirmed address as:  [address]  [place] [address]    Rx dispensed the following with flushes and supplies to match with delivery by 6-9pm 7/31 :  7x ertapenem 1g mb+  DOS 8/1-8/7    Follow up 8/6 : progress, labs, delivery OVN  remainder       [1]   Allergies  Allergen Reactions    Lorazepam Other

## 2025-07-31 NOTE — CARE PLAN
Problem: Pain - Adult  Goal: Verbalizes/displays adequate comfort level or baseline comfort level  Outcome: Progressing     Problem: Safety - Adult  Goal: Free from fall injury  Outcome: Progressing     Problem: Discharge Planning  Goal: Discharge to home or other facility with appropriate resources  Outcome: Progressing     Problem: Chronic Conditions and Co-morbidities  Goal: Patient's chronic conditions and co-morbidity symptoms are monitored and maintained or improved  Outcome: Progressing     Problem: Nutrition  Goal: Nutrient intake appropriate for maintaining nutritional needs  Outcome: Progressing     Problem: Skin  Goal: Decreased wound size/increased tissue granulation at next dressing change  Outcome: Progressing  Flowsheets (Taken 7/31/2025 0044)  Decreased wound size/increased tissue granulation at next dressing change:   Promote sleep for wound healing   Protective dressings over bony prominences   Utilize specialty bed per algorithm  Goal: Participates in plan/prevention/treatment measures  Outcome: Progressing  Flowsheets (Taken 7/31/2025 0044)  Participates in plan/prevention/treatment measures:   Discuss with provider PT/OT consult   Elevate heels  Goal: Prevent/manage excess moisture  Outcome: Progressing  Flowsheets (Taken 7/31/2025 0044)  Prevent/manage excess moisture:   Cleanse incontinence/protect with barrier cream   Moisturize dry skin   Monitor for/manage infection if present  Goal: Prevent/minimize sheer/friction injuries  Outcome: Progressing  Flowsheets (Taken 7/31/2025 0044)  Prevent/minimize sheer/friction injuries:   Turn/reposition every 2 hours/use positioning/transfer devices   HOB 30 degrees or less   Complete micro-shifts as needed if patient unable. Adjust patient position to relieve pressure points, not a full turn   Use pull sheet   Utilize specialty bed per algorithm  Goal: Promote/optimize nutrition  Outcome: Progressing  Flowsheets (Taken 7/31/2025  0044)  Promote/optimize nutrition:   Consume > 50% meals/supplements   Monitor/record intake including meals   Offer water/supplements/favorite foods  Goal: Promote skin healing  Outcome: Progressing  Flowsheets (Taken 7/31/2025 0044)  Promote skin healing:   Assess skin/pad under line(s)/device(s)   Ensure correct size (line/device) and apply per  instructions   Protective dressings over bony prominences   Rotate device position/do not position patient on device   Turn/reposition every 2 hours/use positioning/transfer devices

## 2025-07-31 NOTE — HH CARE COORDINATION
Home Care received a Referral for Infusion and Nursing. We have processed the referral for a Start of Care on 8/1/25.     If you have any questions or concerns, please feel free to contact us at [phone]. Follow the prompts, enter your five digit zip code, and you will be directed to your care team on [facility] 1.

## 2025-08-01 ENCOUNTER — HOME CARE VISIT (OUTPATIENT)
Dept: HOME HEALTH SERVICES | Facility: HOME HEALTH | Age: 55
End: 2025-08-01
Payer: COMMERCIAL

## 2025-08-01 DIAGNOSIS — L03.115 CELLULITIS OF RIGHT LOWER EXTREMITY: ICD-10-CM

## 2025-08-01 DIAGNOSIS — T83.511A URINARY TRACT INFECTION ASSOCIATED WITH CATHETERIZATION OF URINARY TRACT, UNSPECIFIED INDWELLING URINARY CATHETER TYPE, INITIAL ENCOUNTER: ICD-10-CM

## 2025-08-01 DIAGNOSIS — N39.0 URINARY TRACT INFECTION ASSOCIATED WITH CATHETERIZATION OF URINARY TRACT, UNSPECIFIED INDWELLING URINARY CATHETER TYPE, INITIAL ENCOUNTER: ICD-10-CM

## 2025-08-01 LAB
BACTERIA BLD CULT: NORMAL
BACTERIA BLD CULT: NORMAL
STAPHYLOCOCCUS SPEC CULT: NORMAL

## 2025-08-01 PROCEDURE — 169592 NO-PAY CLAIM PROCEDURE

## 2025-08-01 PROCEDURE — G0299 HHS/HOSPICE OF RN EA 15 MIN: HCPCS | Mod: HHH

## 2025-08-01 RX ADMIN — Medication 10 ML: at 14:39

## 2025-08-01 RX ADMIN — Medication 10 ML: at 15:05

## 2025-08-01 NOTE — DOCUMENTATION CLARIFICATION NOTE
"    PATIENT:               EMELINA PAZ  ACCT #:                  [id number]  MRN:                       [id number]  :                       1970  ADMIT DATE:       2025 4:19 PM  DISCH DATE:        2025 5:00 PM  RESPONDING PROVIDER #:        [id number]          PROVIDER RESPONSE TEXT:    UTI related to chronic contreras catheter    CDI QUERY TEXT:    Clarification      Instruction:    Based on your assessment of the patient and the clinical information, please provide the requested documentation by clicking on the appropriate radio button and enter any additional information if prompted.    Question: Please further clarify the relationship between the UTI and the chronic contreras catheter    When answering this query, please exercise your independent professional judgment. The fact that a question is being asked, does not imply that any particular answer is desired or expected.    The patient's clinical indicators include:  Clinical Information: 54 y.o male, quadriplegic admitted with cellulitis and UTI    Clinical Indicators:     ED Provider note: \"He has a chronic contreras\"     H&P: \"...changes his Contreras catheter routinely and uses antibiotic during the change of catheter...Assessment...UTI...Will start IV antibiotic for cellulitis and UTI.\"     ID PN: \"E. coli ESBL urinary tract infection\"    Treatment:  -zosyn IV  -Invanz IV  -Merrem IV  -Contreras exchange    Risk Factors: Quadriplegic with chronic contreras catheter, UTI and cellulitis  Options provided:  -- UTI related to chronic contreras catheter  -- UTI unrelated to chronic contreras catheter  -- Other - I will add my own diagnosis  -- Refer to Clinical Documentation Reviewer    Query created by: Heather Vilchis on 2025 6:26 AM      Electronically signed by:  MARIUSZ RAMIREZ MD 2025 12:46 PM          "

## 2025-08-01 NOTE — PROGRESS NOTES
"John Mora is a 54 y.o. male on day 4 of admission presenting with Atrial flutter, unspecified type (Multi).    Subjective   Patient is doing better today       Objective     Physical Exam  Vitals reviewed.   Constitutional:       Appearance: Normal appearance.   HENT:      Head: Normocephalic and atraumatic.      Right Ear: Tympanic membrane, ear canal and external ear normal.      Left Ear: Tympanic membrane, ear canal and external ear normal.      Nose: Nose normal.      Mouth/Throat:      Pharynx: Oropharynx is clear.     Eyes:      Extraocular Movements: Extraocular movements intact.      Conjunctiva/sclera: Conjunctivae normal.      Pupils: Pupils are equal, round, and reactive to light.       Cardiovascular:      Rate and Rhythm: Normal rate and regular rhythm.      Pulses: Normal pulses.      Heart sounds: Normal heart sounds.   Pulmonary:      Effort: Pulmonary effort is normal.      Breath sounds: Normal breath sounds.   Abdominal:      General: Abdomen is flat. Bowel sounds are normal.      Palpations: Abdomen is soft.     Musculoskeletal:      Cervical back: Normal range of motion and neck supple.     Skin:     General: Skin is warm and dry.      Findings: Erythema present.      Comments: Improving right leg erythema  extending to the lower leg     Neurological:      Mental Status: He is alert and oriented to person, place, and time. Mental status is at baseline.      Motor: Weakness present.      Coordination: Coordination abnormal.      Gait: Gait abnormal.      Comments: Quadriplegic   Psychiatric:         Mood and Affect: Mood normal.         Last Recorded Vitals  Blood pressure 122/88, pulse 107, temperature 36.8 °C (98.2 °F), temperature source Oral, resp. rate 18, height 1.854 m (6' 1\"), weight 109 kg (241 lb 2.9 oz), SpO2 94%.  Intake/Output last 3 Shifts:  I/O last 3 completed shifts:  In: 290 (2.7 mL/kg) [P.O.:240; IV Piggyback:50]  Out: 6825 (62.4 mL/kg) [Urine:6825 (1.7 mL/kg/hr)]  Weight: " 109.4 kg     Relevant Results             Results for orders placed or performed during the hospital encounter of 07/27/25 (from the past 24 hours)   CBC   Result Value Ref Range    WBC 4.2 (L) 4.4 - 11.3 x10*3/uL    nRBC 0.0 0.0 - 0.0 /100 WBCs    RBC 4.23 (L) 4.50 - 5.90 x10*6/uL    Hemoglobin 11.9 (L) 13.5 - 17.5 g/dL    Hematocrit 36.2 (L) 41.0 - 52.0 %    MCV 86 80 - 100 fL    MCH 28.1 26.0 - 34.0 pg    MCHC 32.9 32.0 - 36.0 g/dL    RDW 17.2 (H) 11.5 - 14.5 %    Platelets 153 150 - 450 x10*3/uL   Basic Metabolic Panel   Result Value Ref Range    Glucose 114 (H) 74 - 99 mg/dL    Sodium 134 (L) 136 - 145 mmol/L    Potassium 3.9 3.5 - 5.3 mmol/L    Chloride 101 98 - 107 mmol/L    Bicarbonate 26 21 - 32 mmol/L    Anion Gap 11 10 - 20 mmol/L    Urea Nitrogen 8 6 - 23 mg/dL    Creatinine 0.52 0.50 - 1.30 mg/dL    eGFR >90 >60 mL/min/1.73m*2    Calcium 8.6 8.6 - 10.3 mg/dL   POCT GLUCOSE   Result Value Ref Range    POCT Glucose 129 (H) 74 - 99 mg/dL   POCT GLUCOSE   Result Value Ref Range    POCT Glucose 156 (H) 74 - 99 mg/dL                    Assessment & Plan  Atrial flutter, unspecified type (Multi)    Urinary tract infection    Cellulitis of right lower extremity    Quadriplegia, C1-C4, complete (Multi)    Muscle spasticity    GERD (gastroesophageal reflux disease)    Bilateral great toes ulcers (Multi)    Neurogenic bladder    Neurogenic bowel    History of aplastic anemia    Pressure ulcer    Urine culture growing E.coli ESBL   Wound culture group B streptococcus  PICC line placed  Patient switched to IV antibiotics  Cellulitis is showing a lot of improvement today   atrial flutter related to autonomic dysfunction  EP cardiology input noted  Normal echo  Outpatient follow-up cardiology  air mattress   Wound care for toe wound and hip wound  Monitor blood sugars  Sliding scale insulin  Blood sugars are coming down.  A1c 5.9  High sugar probably from infection  Arrange home care  Discharge home today      I spent   minutes in the professional and overall care of this patient.      Marta Aldridge MD

## 2025-08-01 NOTE — DISCHARGE SUMMARY
Discharge Diagnosis  Atrial flutter, unspecified type (Multi)    Issues Requiring Follow-Up  Right leg cellulitis  Ischial decubitus ulcer  Quadriplegia    Test Results Pending At Discharge  Pending Labs       Order Current Status    Extra Urine Gray Tube In process    Staphylococcus Aureus/MRSA Colonization, Culture - PICC Only In process    Urinalysis with Reflex Culture and Microscopic In process    Blood Culture Preliminary result    Blood Culture Preliminary result            Hospital Course   John Mora is a 54 y.o. male presenting with right thigh redness.  Patient is quadriplegic.  Father is the primary caregiver along with the mother.  He gets caregiver also who gives them back.  Yesterday it was father's time to get better and he noticed that patient has redness on the right thigh.  Last night patient started having chills this morning patient was brought in for the cellulitis of the right thigh.  Patient has a chronic ischial tuberosity pressure sore and under care of wound care.  Recently had to lesions on the toes were treated with antibiotic and has been healing well.  He gets his bowel prep regularly and changes his Kwan catheter routinely and uses antibiotic during the change of catheter.  That has prevented his UTIs.   Treated with IV antibiotic for right leg cellulitis Zosyn and vancomycin.  Wound culture grew group B Streptococcus.  E. coli ESBL in the urine.  Discharged home on IV Invanz.  PICC line placed patient discharged on 14 days of antibiotics  Home care set up  Visit Vitals  /88 (BP Location: Left arm, Patient Position: Lying)   Pulse 107   Temp 36.8 °C (98.2 °F) (Oral)   Resp 18     Vitals:    07/30/25 0334   Weight: 109 kg (241 lb 2.9 oz)       Immunization History   Administered Date(s) Administered    Flu vaccine (IIV4), preservative free *Check age/dose* 10/29/2018, 10/20/2021    Flu vaccine, quadrivalent, no egg protein, age 6 month or greater (FLUCELVAX) 10/15/2020,  10/25/2022    Flu vaccine, trivalent, preservative free, age 6 months and greater (Fluarix/Fluzone/Flulaval) 10/14/2015, 11/15/2017, 10/25/2024    Influenza Whole 11/20/2007    Influenza, Unspecified 10/19/2004, 11/01/2012    Influenza, injectable, quadrivalent 10/18/2019    Influenza, seasonal, injectable 11/04/2008, 11/05/2013, 09/22/2014, 11/11/2016    Moderna COVID-19 vaccine, bivalent, blue cap/gray label *Check age/dose* 11/10/2022    Moderna SARS-CoV-2 Vaccination 02/12/2021, 03/12/2021, 11/09/2021, 06/15/2022    Novel influenza-H1N1-09, preservative-free 11/22/2009    Pfizer COVID-19 vaccine, 12 years and older, (30mcg/0.3mL) (Comirnaty) 01/17/2024, 10/25/2024    Pneumococcal polysaccharide vaccine, 23-valent, age 2 years and older (PNEUMOVAX 23) 11/12/2004       Results      Pertinent Physical Exam At Time of Discharge  Physical Exam  Vitals reviewed.   Constitutional:       Appearance: Normal appearance.   HENT:      Head: Normocephalic and atraumatic.      Right Ear: Tympanic membrane, ear canal and external ear normal.      Left Ear: Tympanic membrane, ear canal and external ear normal.      Nose: Nose normal.      Mouth/Throat:      Pharynx: Oropharynx is clear.     Eyes:      Extraocular Movements: Extraocular movements intact.      Conjunctiva/sclera: Conjunctivae normal.      Pupils: Pupils are equal, round, and reactive to light.       Cardiovascular:      Rate and Rhythm: Normal rate and regular rhythm.      Pulses: Normal pulses.      Heart sounds: Normal heart sounds.   Pulmonary:      Effort: Pulmonary effort is normal.      Breath sounds: Normal breath sounds.   Abdominal:      General: Abdomen is flat. Bowel sounds are normal.      Palpations: Abdomen is soft.     Musculoskeletal:      Cervical back: Normal range of motion and neck supple.     Skin:     General: Skin is warm and dry.      Findings: Erythema present.      Comments: Right thigh erythema     Neurological:      Mental Status: He  is alert and oriented to person, place, and time. Mental status is at baseline.      Comments: Patient quadriplegic   Psychiatric:         Mood and Affect: Mood normal.         Home Medications     Medication List      START taking these medications     ertapenem 1,000 mg in sodium chloride 0.9% 50 mL IV; Infuse 1,000 mg at   120 mL/hr over 30 minutes into a venous catheter once every 24 hours for   14 days.     CONTINUE taking these medications     aspirin 81 mg EC tablet   baclofen 20 mg tablet; Commonly known as: Lioresal; TAKE 1 TABLET BY   MOUTH THREE TIMES DAILY   bisacodyl 10 mg suppository; Commonly known as: Dulcolax   ciprofloxacin 500 mg tablet; Commonly known as: Cipro   citalopram 20 mg tablet; Commonly known as: CeleXA; Take 1 tablet (20   mg) by mouth once daily.   clobetasol 0.05 % topical foam; Commonly known as: Olux; Apply topically   2 times a day.   ketoconazole 2 % shampoo; Commonly known as: NIZOral; Apply topically 2   times a week. Shampoo daily, leave on for 5-10 minutes, then rinse.   omeprazole 20 mg DR capsule; Commonly known as: PriLOSEC   tolterodine 1 mg tablet; Commonly known as: Detrol; TAKE 1 TABLET BY   MOUTH ONCE DAILY   triamcinolone 0.5 % cream; Commonly known as: Kenalog; Apply topically 3   times a day. Apply to affected areas 2-3 times daily   wound dressing paste; Commonly known as: Triad     STOP taking these medications     doxycycline 100 mg capsule; Commonly known as: Vibramycin   omeprazole OTC 20 mg EC tablet; Commonly known as: PriLOSEC OTC     ASK your doctor about these medications     alteplase 2 mg injection; Commonly known as: Cathflo Activase; 2 mL (2   mg) by intra-catheter route 1 time for 1 dose.; Ask about: Should I take   this medication?       Outpatient Follow-Up  Future Appointments   Date Time Provider Department Center   8/1/2025  3:00 PM Taisha Alfaro RN [facility] Te Aldridge MD

## 2025-08-03 ENCOUNTER — HOME CARE VISIT (OUTPATIENT)
Dept: HOME HEALTH SERVICES | Facility: HOME HEALTH | Age: 55
End: 2025-08-03
Payer: COMMERCIAL

## 2025-08-03 VITALS
SYSTOLIC BLOOD PRESSURE: 112 MMHG | RESPIRATION RATE: 16 BRPM | DIASTOLIC BLOOD PRESSURE: 62 MMHG | HEART RATE: 114 BPM | HEIGHT: 73 IN | OXYGEN SATURATION: 97 % | TEMPERATURE: 97.1 F | BODY MASS INDEX: 31.83 KG/M2

## 2025-08-03 VITALS
HEART RATE: 100 BPM | OXYGEN SATURATION: 98 % | SYSTOLIC BLOOD PRESSURE: 133 MMHG | DIASTOLIC BLOOD PRESSURE: 79 MMHG | RESPIRATION RATE: 18 BRPM | TEMPERATURE: 98 F

## 2025-08-03 PROCEDURE — G0299 HHS/HOSPICE OF RN EA 15 MIN: HCPCS | Mod: HHH

## 2025-08-03 RX ADMIN — Medication 10 ML: at 09:23

## 2025-08-03 ASSESSMENT — ENCOUNTER SYMPTOMS
PAIN LOCATION - PAIN QUALITY: SORE
PAIN LOCATION - PAIN FREQUENCY: CONSTANT
PAIN: 1
CHANGE IN APPETITE: UNCHANGED
SUBJECTIVE PAIN PROGRESSION: GRADUALLY IMPROVING
LOWEST PAIN SEVERITY IN PAST 24 HOURS: 2/10
PAIN LOCATION: GENERALIZED
PERSON REPORTING PAIN: PATIENT
APPETITE LEVEL: GOOD
HIGHEST PAIN SEVERITY IN PAST 24 HOURS: 6/10
SLEEP QUALITY: FAIR
PAIN SEVERITY GOAL: 0/10
ANGER WITHIN DEFINED LIMITS: 1
PAIN LOCATION - EXACERBATING FACTORS: MOVEMENT
MUSCLE WEAKNESS: 1
PAIN LOCATION - PAIN SEVERITY: 4/10
AGGRESSION WITHIN DEFINED LIMITS: 1
PAIN LOCATION - PAIN DURATION: DAILY
DENIES PAIN: 1
PAIN LOCATION - RELIEVING FACTORS: REST
APPETITE LEVEL: GOOD
FATIGUES EASILY: 1

## 2025-08-03 ASSESSMENT — ACTIVITIES OF DAILY LIVING (ADL)
CURRENT_FUNCTION: STAND BY ASSIST
OASIS_M1830: 06
AMBULATION ASSISTANCE: STAND BY ASSIST
ENTERING_EXITING_HOME: DEPENDENT

## 2025-08-05 ENCOUNTER — HOME INFUSION (OUTPATIENT)
Dept: INFUSION THERAPY | Age: 55
End: 2025-08-05
Payer: COMMERCIAL

## 2025-08-05 ENCOUNTER — DOCUMENTATION (OUTPATIENT)
Dept: INFUSION THERAPY | Age: 55
End: 2025-08-05
Payer: COMMERCIAL

## 2025-08-05 NOTE — PROGRESS NOTES
Reivew of chart. Patient with order for ertapenem 1gm daily thru 8/13/25 for rt thigh cellulitis w/ ESBL UTI. Weekly labs are ordered with results to dr Rosales.    Review of RN visit notes. No problems noted with infusions or line. RN visit scheduled for tomorrow for labs and dressing change.    No new labs for review since discharge home.    Tel call with father. Infusions are going well. He had a little trouble in the beginning but has it figured out now. He confirmed that he has doses in the home thru Thursday 8/7 and is agreeable to delivery of remaining doses 8/7 with supplies to match.    Processed fill for 6 x ertapenem MB+ for mix and ovn delivery 8/6/15 to cover doses 8/8 thru 8/13/25.    Follow up 8/13/25 with dr Rosales for plan of care. Patient has a picc.

## 2025-08-05 NOTE — PROGRESS NOTES
Delivery of the infusion medication and all supplies, including standard amount of flushes and dressing, is scheduled for Thursday 8/7/25.

## 2025-08-06 ENCOUNTER — HOME CARE VISIT (OUTPATIENT)
Dept: HOME HEALTH SERVICES | Facility: HOME HEALTH | Age: 55
End: 2025-08-06
Payer: COMMERCIAL

## 2025-08-06 VITALS
HEART RATE: 72 BPM | SYSTOLIC BLOOD PRESSURE: 118 MMHG | TEMPERATURE: 97.7 F | RESPIRATION RATE: 18 BRPM | DIASTOLIC BLOOD PRESSURE: 62 MMHG | OXYGEN SATURATION: 96 %

## 2025-08-06 LAB
ALBUMIN SERPL-MCNC: 3.8 G/DL (ref 3.6–5.1)
ALP SERPL-CCNC: 56 U/L (ref 35–144)
ALT SERPL-CCNC: 32 U/L (ref 9–46)
ANION GAP SERPL CALCULATED.4IONS-SCNC: 8 MMOL/L (CALC) (ref 7–17)
AST SERPL-CCNC: 28 U/L (ref 10–35)
BASOPHILS # BLD AUTO: 40 CELLS/UL (ref 0–200)
BASOPHILS NFR BLD AUTO: 0.8 %
BILIRUB SERPL-MCNC: 0.4 MG/DL (ref 0.2–1.2)
BUN SERPL-MCNC: 11 MG/DL (ref 7–25)
CALCIUM SERPL-MCNC: 8.5 MG/DL (ref 8.6–10.3)
CHLORIDE SERPL-SCNC: 104 MMOL/L (ref 98–110)
CO2 SERPL-SCNC: 26 MMOL/L (ref 20–32)
CREAT SERPL-MCNC: 0.49 MG/DL (ref 0.7–1.3)
EGFRCR SERPLBLD CKD-EPI 2021: 122 ML/MIN/1.73M2
EOSINOPHIL # BLD AUTO: 100 CELLS/UL (ref 15–500)
EOSINOPHIL NFR BLD AUTO: 2 %
ERYTHROCYTE [DISTWIDTH] IN BLOOD BY AUTOMATED COUNT: 16.4 % (ref 11–15)
GLUCOSE SERPL-MCNC: 88 MG/DL (ref 65–99)
HCT VFR BLD AUTO: 39.4 % (ref 38.5–50)
HGB BLD-MCNC: 12.6 G/DL (ref 13.2–17.1)
LYMPHOCYTES # BLD AUTO: 1180 CELLS/UL (ref 850–3900)
LYMPHOCYTES NFR BLD AUTO: 23.6 %
MCH RBC QN AUTO: 28.8 PG (ref 27–33)
MCHC RBC AUTO-ENTMCNC: 32 G/DL (ref 32–36)
MCV RBC AUTO: 90.2 FL (ref 80–100)
MONOCYTES # BLD AUTO: 345 CELLS/UL (ref 200–950)
MONOCYTES NFR BLD AUTO: 6.9 %
NEUTROPHILS # BLD AUTO: 3335 CELLS/UL (ref 1500–7800)
NEUTROPHILS NFR BLD AUTO: 66.7 %
PLATELET # BLD AUTO: 247 THOUSAND/UL (ref 140–400)
PMV BLD REES-ECKER: 9.2 FL (ref 7.5–12.5)
POTASSIUM SERPL-SCNC: 4.2 MMOL/L (ref 3.5–5.3)
PROT SERPL-MCNC: 6.7 G/DL (ref 6.1–8.1)
RBC # BLD AUTO: 4.37 MILLION/UL (ref 4.2–5.8)
SODIUM SERPL-SCNC: 138 MMOL/L (ref 135–146)
WBC # BLD AUTO: 5 THOUSAND/UL (ref 3.8–10.8)

## 2025-08-06 PROCEDURE — G0299 HHS/HOSPICE OF RN EA 15 MIN: HCPCS | Mod: HHH

## 2025-08-06 RX ADMIN — Medication 20 ML: at 10:49

## 2025-08-06 ASSESSMENT — ACTIVITIES OF DAILY LIVING (ADL)
AMBULATION ASSISTANCE: NON-AMBULATORY
CURRENT_FUNCTION: TWO PERSON

## 2025-08-06 ASSESSMENT — ENCOUNTER SYMPTOMS
APPETITE LEVEL: GOOD
DENIES PAIN: 1

## 2025-08-13 ENCOUNTER — HOME CARE VISIT (OUTPATIENT)
Dept: HOME HEALTH SERVICES | Facility: HOME HEALTH | Age: 55
End: 2025-08-13
Payer: COMMERCIAL

## 2025-08-13 ENCOUNTER — HOME INFUSION (OUTPATIENT)
Dept: INFUSION THERAPY | Age: 55
End: 2025-08-13
Payer: COMMERCIAL

## 2025-08-13 VITALS
SYSTOLIC BLOOD PRESSURE: 106 MMHG | HEART RATE: 79 BPM | TEMPERATURE: 97.3 F | RESPIRATION RATE: 18 BRPM | OXYGEN SATURATION: 96 % | DIASTOLIC BLOOD PRESSURE: 66 MMHG

## 2025-08-13 DIAGNOSIS — L03.115 CELLULITIS OF RIGHT LOWER EXTREMITY: Primary | ICD-10-CM

## 2025-08-13 PROCEDURE — G0299 HHS/HOSPICE OF RN EA 15 MIN: HCPCS | Mod: HHH

## 2025-08-13 ASSESSMENT — ENCOUNTER SYMPTOMS: DENIES PAIN: 1

## 2025-08-13 ASSESSMENT — ACTIVITIES OF DAILY LIVING (ADL)
OASIS_M1830: 06
HOME_HEALTH_OASIS: 01